# Patient Record
Sex: MALE | Race: WHITE | NOT HISPANIC OR LATINO | URBAN - METROPOLITAN AREA
[De-identification: names, ages, dates, MRNs, and addresses within clinical notes are randomized per-mention and may not be internally consistent; named-entity substitution may affect disease eponyms.]

---

## 2023-09-14 ENCOUNTER — HOSPITAL ENCOUNTER (OUTPATIENT)
Dept: DATA CONVERSION | Facility: HOSPITAL | Age: 58
Discharge: HOME | End: 2023-09-14

## 2023-09-14 DIAGNOSIS — R97.20 ELEVATED PROSTATE SPECIFIC ANTIGEN (PSA): ICD-10-CM

## 2023-10-16 ENCOUNTER — CLINICAL SUPPORT (OUTPATIENT)
Dept: PREADMISSION TESTING | Facility: HOSPITAL | Age: 58
End: 2023-10-16
Payer: COMMERCIAL

## 2023-10-16 VITALS
BODY MASS INDEX: 25.31 KG/M2 | HEIGHT: 71 IN | OXYGEN SATURATION: 100 % | TEMPERATURE: 97.7 F | DIASTOLIC BLOOD PRESSURE: 82 MMHG | WEIGHT: 180.78 LBS | SYSTOLIC BLOOD PRESSURE: 123 MMHG | HEART RATE: 56 BPM | RESPIRATION RATE: 16 BRPM

## 2023-10-16 RX ORDER — MULTIVIT-MIN/FA/LYCOPEN/LUTEIN .4-300-25
1 TABLET ORAL DAILY
COMMUNITY

## 2023-10-16 RX ORDER — IBUPROFEN 600 MG/1
600 TABLET ORAL EVERY 6 HOURS PRN
COMMUNITY

## 2023-10-16 RX ORDER — VIT C/E/ZN/COPPR/LUTEIN/ZEAXAN 250MG-90MG
25 CAPSULE ORAL DAILY
COMMUNITY

## 2023-10-16 ASSESSMENT — DUKE ACTIVITY SCORE INDEX (DASI)
CAN YOU PARTICIPATE IN STRENOUS SPORTS LIKE SWIMMING, SINGLES TENNIS, FOOTBALL, BASKETBALL, OR SKIING: YES
TOTAL_SCORE: 58.2
CAN YOU DO HEAVY WORK AROUND THE HOUSE LIKE SCRUBBING FLOORS OR LIFTING AND MOVING HEAVY FURNITURE: YES
DASI METS SCORE: 9.9
CAN YOU DO MODERATE WORK AROUND THE HOUSE LIKE VACUUMING, SWEEPING FLOORS OR CARRYING GROCERIES: YES
CAN YOU PARTICIPATE IN MODERATE RECREATIONAL ACTIVITIES LIKE GOLF, BOWLING, DANCING, DOUBLES TENNIS OR THROWING A BASEBALL OR FOOTBALL: YES
CAN YOU RUN A SHORT DISTANCE: YES
CAN YOU HAVE SEXUAL RELATIONS: YES
CAN YOU WALK INDOORS, SUCH AS AROUND YOUR HOUSE: YES
CAN YOU CLIMB A FLIGHT OF STAIRS OR WALK UP A HILL: YES
CAN YOU DO LIGHT WORK AROUND THE HOUSE LIKE DUSTING OR WASHING DISHES: YES
CAN YOU WALK A BLOCK OR TWO ON LEVEL GROUND: YES
CAN YOU TAKE CARE OF YOURSELF (EAT, DRESS, BATHE, OR USE TOILET): YES
CAN YOU DO YARD WORK LIKE RAKING LEAVES, WEEDING OR PUSHING A MOWER: YES

## 2023-10-16 ASSESSMENT — ENCOUNTER SYMPTOMS
RESPIRATORY NEGATIVE: 1
ENDOCRINE NEGATIVE: 1
CONSTITUTIONAL NEGATIVE: 1
GASTROINTESTINAL NEGATIVE: 1
MUSCULOSKELETAL NEGATIVE: 1
CARDIOVASCULAR NEGATIVE: 1
PSYCHIATRIC NEGATIVE: 1
NEUROLOGICAL NEGATIVE: 1
EYES NEGATIVE: 1

## 2023-10-16 ASSESSMENT — CHADS2 SCORE
PRIOR STROKE OR TIA OR THROMBOEMBOLISM: NO
DIABETES: NO
CHADS2 SCORE: 0
HYPERTENSION: NO
CHF: NO
AGE GREATER THAN OR EQUAL TO 75: NO

## 2023-10-16 ASSESSMENT — PAIN SCALES - GENERAL: PAINLEVEL_OUTOF10: 0 - NO PAIN

## 2023-10-16 ASSESSMENT — PAIN - FUNCTIONAL ASSESSMENT: PAIN_FUNCTIONAL_ASSESSMENT: 0-10

## 2023-10-16 NOTE — CPM/PAT H&P
"CPM/PAT Evaluation       Name: Qasim Miller (Qasim Miller)  /Age: 1965/58 y.o.     In-Person       Chief Complaint: \"elevated PSA\"    HPI    The patient is a 58 year old male.  For the past year his PSA has been elevated.  He complains of urinary hesitancy and urinary urgency, but no dysuria, hematuria or stress urinary incontinence.  He has been followed by Dr. Blackwood and a prostate MRI was abnormal.  A prostate biopsy is recommended at this time.    No past medical history on file.    Past Surgical History:   Procedure Laterality Date    COLONOSCOPY  10/27/2015    Colonoscopy (Fiberoptic)     Social History     Tobacco Use    Smoking status: Never    Smokeless tobacco: Never   Substance Use Topics    Alcohol use: Yes     Comment: SELDOM     Social History     Substance and Sexual Activity   Drug Use Never       No Known Allergies    Current Outpatient Medications   Medication Sig Dispense Refill    cholecalciferol (Vitamin D-3) 25 MCG (1000 UT) capsule Take 1 capsule (25 mcg) by mouth once daily.      ibuprofen 600 mg tablet Take 1 tablet (600 mg) by mouth every 6 hours if needed for mild pain (1 - 3).      multivitamin with minerals iron-free (Centrum Silver) Take 1 tablet by mouth once daily.       No current facility-administered medications for this visit.     Review of Systems   Constitutional: Negative.    HENT: Negative.     Eyes: Negative.    Respiratory: Negative.     Cardiovascular: Negative.    Gastrointestinal: Negative.    Endocrine: Negative.    Genitourinary:         See HPI   Musculoskeletal: Negative.    Neurological: Negative.    Psychiatric/Behavioral: Negative.       /82   Pulse 56   Temp 36.5 °C (97.7 °F) (Temporal)   Resp 16   Ht 1.803 m (5' 11\")   Wt 82 kg (180 lb 12.4 oz)   SpO2 100%   BMI 25.21 kg/m²        Physical Exam  Vitals reviewed.   Constitutional:       Appearance: Normal appearance.   HENT:      Head: Normocephalic and atraumatic.      " Mouth/Throat:      Mouth: Mucous membranes are moist.      Pharynx: Oropharynx is clear.   Eyes:      Extraocular Movements: Extraocular movements intact.      Pupils: Pupils are equal, round, and reactive to light.   Cardiovascular:      Rate and Rhythm: Regular rhythm. Bradycardia present.      Heart sounds: Normal heart sounds.   Pulmonary:      Breath sounds: Normal breath sounds.   Abdominal:      General: Bowel sounds are normal.      Palpations: Abdomen is soft.   Musculoskeletal:         General: Normal range of motion.      Cervical back: Normal range of motion.   Skin:     General: Skin is warm and dry.   Neurological:      General: No focal deficit present.      Mental Status: He is alert and oriented to person, place, and time.   Psychiatric:         Mood and Affect: Mood normal.         Behavior: Behavior normal.        PAT AIRWAY:   Airway:     Neck ROM::  Full      DASI Risk Score      Flowsheet Row Most Recent Value   DASI SCORE 58.2   METS Score (Will be calculated only when all the questions are answered) 9.9     CHADS2 Score: 0      Revised Cardiac Risk Index      Flowsheet Row Most Recent Value   Revised Cardiac Risk Calculator 0       Stop Bang Score      Flowsheet Row Most Recent Value   Do you snore loudly? 0   Do you often feel tired or fatigued after your sleep? 0   Has anyone ever observed you stop breathing in your sleep? 0   Do you have or are you being treated for high blood pressure? 0   Recent BMI (Calculated) 25.2   Is BMI greater than 35 kg/m2? 0=No   Age older than 50 years old? 1=Yes   Is your neck circumference greater than 17 inches (Male) or 16 inches (Female)? 0     Assessment and Plan:     Elevated prostate specific antigen:  MRI guided ultrasound prostate biopsy      Jelly Franco PA-C

## 2023-10-16 NOTE — PREPROCEDURE INSTRUCTIONS
PAT DISCHARGE INSTRUCTIONS    Please call the Same Day Surgery (SDS) Department of the hospital where your procedure will be performed after 2:00 PM the day before your surgery. If you are scheduled on a Monday, or a Tuesday following a Monday holiday, you will need to call on the last business day prior to your surgery.    [facility]  [id number] [place] [address], [address]  [phone]    [address] [address] [phone]    [facility]  [address]  [place] [id number]  [phone]    Please let your surgeon know if:      You develop any open sores, shingles, burning or painful urination as these may increase your risk of an infection.   You no longer wish to have the surgery.   Any other personal circumstances change that may lead to the need to cancel or defer this surgery-such as being sick or getting admitted to any hospital within one week of your planned procedure.    Your contact details change, such as a change of address or phone number.    Starting now:     Please DO NOT drink alcohol or smoke for 24 hours before surgery. It is well known that quitting smoking can make a huge difference to your health and recovery from surgery. The longer you abstain from smoking, the better your chances of a healthy recovery. If you need help with quitting, call 9-800QUIT-NOW to be connected to a trained counselor who will discuss the best methods to help you quit.     Before your surgery:    Please stop all supplements 7 days prior to surgery. Or as directed by your surgeon.   Please stop taking NSAID pain medicine such as Advil and Motrin 5 days before surgery.    If you develop any fever, cough, cold, rashes, cuts, scratches, scrapes, urinary symptoms or infection anywhere on your body (including teeth and gums) prior to surgery, please call your surgeon’s office as soon as possible. This may require treatment to reduce the chance of  cancellation on the day of surgery.    The day before your surgery:   DIET- Do not eat any solid food or drink anything after midnight the night before surgery. This includes gum, mints, hard candy and coffee.    Get a good night’s rest. Use the special soap for bathing if you have been instructed to use one.    Arrival time is typically 2 hours prior to the time of surgery.    Scheduled surgery times may change and you will be notified if this occurs - please check your personal voicemail for any updates.     On the morning of surgery:   Wear comfortable, loose fitting clothes which open in the front. Please do not wear moisturizers, creams, lotions, makeup or perfume.    Please bring with you to surgery:   Photo ID and insurance card   Current list of medicines and allergies   Pacemaker/ Defibrillator/Heart stent cards   CPAP machine and mask    Slings/ splints/ crutches   A copy of your complete advanced directive/DHPOA.    Please do NOT bring with you to surgery:   All jewelry and valuables should be left at home.   Prosthetic devices such as contact lenses, hearing aids, dentures, eyelash extensions, hairpins and body piercings must be removed prior to going in to the surgical suite.    After outpatient surgery:   A responsible adult MUST accompany you at the time of discharge and stay with you for 24 hours after your surgery. You may NOT drive yourself home after surgery.    Do not drive, operate machinery, make critical decisions or do activities that require co-ordination or balance until after a night’s sleep.   Do not drink alcoholic beverages for 24 hours.   Instructions for resuming your medications will be provided by your surgeon.    CALL YOUR DOCTOR AFTER SURGERY IF YOU HAVE:     Chills and/or a fever of 101° F or higher.    Redness, swelling, pus or drainage from your surgical wound or a bad smell from the wound.    Lightheadedness, fainting or confusion.    Persistent vomiting (throwing up) and are  not able to eat or drink for 12 hours.    Three or more loose, watery bowel movements in 24 hours (diarrhea).   Difficulty or pain while urinating( after non-urological surgery)    Pain and swelling in your legs, especially if it is only on one side.    Difficulty breathing or are breathing faster than normal.    Any new concerning symptoms.

## 2023-10-16 NOTE — H&P (VIEW-ONLY)
"CPM/PAT Evaluation       Name: Qasim Miller (Qasim Mliler)  /Age: 1965/58 y.o.     In-Person       Chief Complaint: \"elevated PSA\"    HPI    The patient is a 58 year old male.  For the past year his PSA has been elevated.  He complains of urinary hesitancy and urinary urgency, but no dysuria, hematuria or stress urinary incontinence.  He has been followed by Dr. Blackwood and a prostate MRI was abnormal.  A prostate biopsy is recommended at this time.    No past medical history on file.    Past Surgical History:   Procedure Laterality Date    COLONOSCOPY  10/27/2015    Colonoscopy (Fiberoptic)     Social History     Tobacco Use    Smoking status: Never    Smokeless tobacco: Never   Substance Use Topics    Alcohol use: Yes     Comment: SELDOM     Social History     Substance and Sexual Activity   Drug Use Never       No Known Allergies    Current Outpatient Medications   Medication Sig Dispense Refill    cholecalciferol (Vitamin D-3) 25 MCG (1000 UT) capsule Take 1 capsule (25 mcg) by mouth once daily.      ibuprofen 600 mg tablet Take 1 tablet (600 mg) by mouth every 6 hours if needed for mild pain (1 - 3).      multivitamin with minerals iron-free (Centrum Silver) Take 1 tablet by mouth once daily.       No current facility-administered medications for this visit.     Review of Systems   Constitutional: Negative.    HENT: Negative.     Eyes: Negative.    Respiratory: Negative.     Cardiovascular: Negative.    Gastrointestinal: Negative.    Endocrine: Negative.    Genitourinary:         See HPI   Musculoskeletal: Negative.    Neurological: Negative.    Psychiatric/Behavioral: Negative.       /82   Pulse 56   Temp 36.5 °C (97.7 °F) (Temporal)   Resp 16   Ht 1.803 m (5' 11\")   Wt 82 kg (180 lb 12.4 oz)   SpO2 100%   BMI 25.21 kg/m²        Physical Exam  Vitals reviewed.   Constitutional:       Appearance: Normal appearance.   HENT:      Head: Normocephalic and atraumatic.      " Mouth/Throat:      Mouth: Mucous membranes are moist.      Pharynx: Oropharynx is clear.   Eyes:      Extraocular Movements: Extraocular movements intact.      Pupils: Pupils are equal, round, and reactive to light.   Cardiovascular:      Rate and Rhythm: Regular rhythm. Bradycardia present.      Heart sounds: Normal heart sounds.   Pulmonary:      Breath sounds: Normal breath sounds.   Abdominal:      General: Bowel sounds are normal.      Palpations: Abdomen is soft.   Musculoskeletal:         General: Normal range of motion.      Cervical back: Normal range of motion.   Skin:     General: Skin is warm and dry.   Neurological:      General: No focal deficit present.      Mental Status: He is alert and oriented to person, place, and time.   Psychiatric:         Mood and Affect: Mood normal.         Behavior: Behavior normal.        PAT AIRWAY:   Airway:     Neck ROM::  Full      DASI Risk Score      Flowsheet Row Most Recent Value   DASI SCORE 58.2   METS Score (Will be calculated only when all the questions are answered) 9.9     CHADS2 Score: 0      Revised Cardiac Risk Index      Flowsheet Row Most Recent Value   Revised Cardiac Risk Calculator 0       Stop Bang Score      Flowsheet Row Most Recent Value   Do you snore loudly? 0   Do you often feel tired or fatigued after your sleep? 0   Has anyone ever observed you stop breathing in your sleep? 0   Do you have or are you being treated for high blood pressure? 0   Recent BMI (Calculated) 25.2   Is BMI greater than 35 kg/m2? 0=No   Age older than 50 years old? 1=Yes   Is your neck circumference greater than 17 inches (Male) or 16 inches (Female)? 0     Assessment and Plan:     Elevated prostate specific antigen:  MRI guided ultrasound prostate biopsy      Jelly Franco PA-C

## 2023-10-18 ENCOUNTER — ANESTHESIA EVENT (OUTPATIENT)
Dept: OPERATING ROOM | Facility: HOSPITAL | Age: 58
End: 2023-10-18
Payer: COMMERCIAL

## 2023-10-20 ENCOUNTER — HOSPITAL ENCOUNTER (OUTPATIENT)
Facility: HOSPITAL | Age: 58
Setting detail: OUTPATIENT SURGERY
Discharge: HOME | End: 2023-10-20
Attending: UROLOGY | Admitting: UROLOGY
Payer: COMMERCIAL

## 2023-10-20 ENCOUNTER — ANESTHESIA (OUTPATIENT)
Dept: OPERATING ROOM | Facility: HOSPITAL | Age: 58
End: 2023-10-20
Payer: COMMERCIAL

## 2023-10-20 VITALS
HEART RATE: 54 BPM | RESPIRATION RATE: 16 BRPM | OXYGEN SATURATION: 100 % | TEMPERATURE: 96.8 F | BODY MASS INDEX: 25.52 KG/M2 | WEIGHT: 182.32 LBS | HEIGHT: 71 IN | DIASTOLIC BLOOD PRESSURE: 63 MMHG | SYSTOLIC BLOOD PRESSURE: 101 MMHG

## 2023-10-20 DIAGNOSIS — R97.20 ELEVATED PSA: ICD-10-CM

## 2023-10-20 PROCEDURE — 88305 TISSUE EXAM BY PATHOLOGIST: CPT | Mod: TC | Performed by: UROLOGY

## 2023-10-20 PROCEDURE — 3600000002 HC OR TIME - INITIAL BASE CHARGE - PROCEDURE LEVEL TWO: Performed by: UROLOGY

## 2023-10-20 PROCEDURE — A55700 PR BIOPSY OF PROSTATE,NEEDLE/PUNCH: Performed by: ANESTHESIOLOGIST ASSISTANT

## 2023-10-20 PROCEDURE — 88305 TISSUE EXAM BY PATHOLOGIST: CPT | Performed by: PATHOLOGY

## 2023-10-20 PROCEDURE — 7100000002 HC RECOVERY ROOM TIME - EACH INCREMENTAL 1 MINUTE: Performed by: UROLOGY

## 2023-10-20 PROCEDURE — 2500000004 HC RX 250 GENERAL PHARMACY W/ HCPCS (ALT 636 FOR OP/ED): Performed by: UROLOGY

## 2023-10-20 PROCEDURE — 2720000007 HC OR 272 NO HCPCS: Performed by: UROLOGY

## 2023-10-20 PROCEDURE — 88305 TISSUE EXAM BY PATHOLOGIST: CPT | Mod: TC,SUR | Performed by: UROLOGY

## 2023-10-20 PROCEDURE — 3700000002 HC GENERAL ANESTHESIA TIME - EACH INCREMENTAL 1 MINUTE: Performed by: UROLOGY

## 2023-10-20 PROCEDURE — 7100000010 HC PHASE TWO TIME - EACH INCREMENTAL 1 MINUTE: Performed by: UROLOGY

## 2023-10-20 PROCEDURE — A55700 PR BIOPSY OF PROSTATE,NEEDLE/PUNCH: Performed by: ANESTHESIOLOGY

## 2023-10-20 PROCEDURE — 3600000007 HC OR TIME - EACH INCREMENTAL 1 MINUTE - PROCEDURE LEVEL TWO: Performed by: UROLOGY

## 2023-10-20 PROCEDURE — 2500000004 HC RX 250 GENERAL PHARMACY W/ HCPCS (ALT 636 FOR OP/ED): Performed by: ANESTHESIOLOGIST ASSISTANT

## 2023-10-20 PROCEDURE — 3700000001 HC GENERAL ANESTHESIA TIME - INITIAL BASE CHARGE: Performed by: UROLOGY

## 2023-10-20 PROCEDURE — 7100000001 HC RECOVERY ROOM TIME - INITIAL BASE CHARGE: Performed by: UROLOGY

## 2023-10-20 PROCEDURE — 7100000009 HC PHASE TWO TIME - INITIAL BASE CHARGE: Performed by: UROLOGY

## 2023-10-20 RX ORDER — MIDAZOLAM HYDROCHLORIDE 1 MG/ML
INJECTION, SOLUTION INTRAMUSCULAR; INTRAVENOUS AS NEEDED
Status: DISCONTINUED | OUTPATIENT
Start: 2023-10-20 | End: 2023-10-20

## 2023-10-20 RX ORDER — FENTANYL CITRATE 50 UG/ML
INJECTION, SOLUTION INTRAMUSCULAR; INTRAVENOUS AS NEEDED
Status: DISCONTINUED | OUTPATIENT
Start: 2023-10-20 | End: 2023-10-20

## 2023-10-20 RX ORDER — CIPROFLOXACIN 500 MG/1
500 TABLET ORAL 2 TIMES DAILY
Qty: 4 TABLET | Refills: 0 | Status: SHIPPED | OUTPATIENT
Start: 2023-10-20

## 2023-10-20 RX ORDER — LABETALOL HYDROCHLORIDE 5 MG/ML
10 INJECTION, SOLUTION INTRAVENOUS ONCE AS NEEDED
Status: DISCONTINUED | OUTPATIENT
Start: 2023-10-20 | End: 2023-10-20 | Stop reason: HOSPADM

## 2023-10-20 RX ORDER — OXYCODONE HCL 10 MG/1
10 TABLET, FILM COATED, EXTENDED RELEASE ORAL ONCE AS NEEDED
Status: DISCONTINUED | OUTPATIENT
Start: 2023-10-20 | End: 2023-10-20 | Stop reason: HOSPADM

## 2023-10-20 RX ORDER — CEFTRIAXONE 2 G/50ML
2 INJECTION, SOLUTION INTRAVENOUS ONCE
Status: COMPLETED | OUTPATIENT
Start: 2023-10-20 | End: 2023-10-20

## 2023-10-20 RX ORDER — CEFTRIAXONE 2 G/1
2 INJECTION, POWDER, FOR SOLUTION INTRAMUSCULAR; INTRAVENOUS ONCE
Status: DISCONTINUED | OUTPATIENT
Start: 2023-10-20 | End: 2023-10-20

## 2023-10-20 RX ORDER — METOCLOPRAMIDE 10 MG/1
10 TABLET ORAL ONCE
Status: CANCELLED | OUTPATIENT
Start: 2023-10-20 | End: 2023-10-20

## 2023-10-20 RX ORDER — FAMOTIDINE 20 MG/1
20 TABLET, FILM COATED ORAL ONCE
Status: CANCELLED | OUTPATIENT
Start: 2023-10-20 | End: 2023-10-20

## 2023-10-20 RX ORDER — DIPHENHYDRAMINE HYDROCHLORIDE 50 MG/ML
12.5 INJECTION INTRAMUSCULAR; INTRAVENOUS ONCE AS NEEDED
Status: DISCONTINUED | OUTPATIENT
Start: 2023-10-20 | End: 2023-10-20 | Stop reason: HOSPADM

## 2023-10-20 RX ORDER — ONDANSETRON HYDROCHLORIDE 2 MG/ML
INJECTION, SOLUTION INTRAVENOUS AS NEEDED
Status: DISCONTINUED | OUTPATIENT
Start: 2023-10-20 | End: 2023-10-20

## 2023-10-20 RX ORDER — ALBUTEROL SULFATE 0.83 MG/ML
2.5 SOLUTION RESPIRATORY (INHALATION) ONCE
Status: CANCELLED | OUTPATIENT
Start: 2023-10-20 | End: 2023-10-20

## 2023-10-20 RX ORDER — ACETAMINOPHEN 10 MG/ML
1000 INJECTION, SOLUTION INTRAVENOUS ONCE AS NEEDED
Status: DISCONTINUED | OUTPATIENT
Start: 2023-10-20 | End: 2023-10-20 | Stop reason: HOSPADM

## 2023-10-20 RX ORDER — PHENYLEPHRINE HYDROCHLORIDE 10 MG/ML
INJECTION INTRAVENOUS AS NEEDED
Status: DISCONTINUED | OUTPATIENT
Start: 2023-10-20 | End: 2023-10-20

## 2023-10-20 RX ORDER — PROPOFOL 10 MG/ML
INJECTION, EMULSION INTRAVENOUS AS NEEDED
Status: DISCONTINUED | OUTPATIENT
Start: 2023-10-20 | End: 2023-10-20

## 2023-10-20 RX ORDER — PROPOFOL 10 MG/ML
INJECTION, EMULSION INTRAVENOUS CONTINUOUS PRN
Status: DISCONTINUED | OUTPATIENT
Start: 2023-10-20 | End: 2023-10-20

## 2023-10-20 RX ORDER — ALBUTEROL SULFATE 0.83 MG/ML
2.5 SOLUTION RESPIRATORY (INHALATION) ONCE AS NEEDED
Status: DISCONTINUED | OUTPATIENT
Start: 2023-10-20 | End: 2023-10-20 | Stop reason: HOSPADM

## 2023-10-20 RX ORDER — ONDANSETRON HYDROCHLORIDE 2 MG/ML
4 INJECTION, SOLUTION INTRAVENOUS ONCE AS NEEDED
Status: DISCONTINUED | OUTPATIENT
Start: 2023-10-20 | End: 2023-10-20 | Stop reason: HOSPADM

## 2023-10-20 RX ORDER — SODIUM CHLORIDE 9 MG/ML
50 INJECTION, SOLUTION INTRAVENOUS CONTINUOUS
Status: DISCONTINUED | OUTPATIENT
Start: 2023-10-20 | End: 2023-10-20 | Stop reason: HOSPADM

## 2023-10-20 RX ORDER — KETOROLAC TROMETHAMINE 15 MG/ML
15 INJECTION, SOLUTION INTRAMUSCULAR; INTRAVENOUS ONCE AS NEEDED
Status: DISCONTINUED | OUTPATIENT
Start: 2023-10-20 | End: 2023-10-20 | Stop reason: HOSPADM

## 2023-10-20 RX ORDER — HYDRALAZINE HYDROCHLORIDE 20 MG/ML
10 INJECTION INTRAMUSCULAR; INTRAVENOUS EVERY 30 MIN PRN
Status: DISCONTINUED | OUTPATIENT
Start: 2023-10-20 | End: 2023-10-20 | Stop reason: HOSPADM

## 2023-10-20 RX ADMIN — PHENYLEPHRINE HYDROCHLORIDE 100 MCG: 10 INJECTION INTRAVENOUS at 07:19

## 2023-10-20 RX ADMIN — PROPOFOL 75 MCG/KG/MIN: 10 INJECTION, EMULSION INTRAVENOUS at 07:13

## 2023-10-20 RX ADMIN — ONDANSETRON 4 MG: 2 INJECTION INTRAMUSCULAR; INTRAVENOUS at 07:18

## 2023-10-20 RX ADMIN — FENTANYL CITRATE 50 MCG: 0.05 INJECTION, SOLUTION INTRAMUSCULAR; INTRAVENOUS at 07:12

## 2023-10-20 RX ADMIN — CEFTRIAXONE SODIUM 2 G: 2 INJECTION, SOLUTION INTRAVENOUS at 07:14

## 2023-10-20 RX ADMIN — MIDAZOLAM 2 MG: 1 INJECTION INTRAMUSCULAR; INTRAVENOUS at 07:05

## 2023-10-20 RX ADMIN — PROPOFOL 70 MG: 10 INJECTION, EMULSION INTRAVENOUS at 07:13

## 2023-10-20 RX ADMIN — SODIUM CHLORIDE 50 ML/HR: 900 INJECTION, SOLUTION INTRAVENOUS at 06:01

## 2023-10-20 RX ADMIN — PHENYLEPHRINE HYDROCHLORIDE 100 MCG: 10 INJECTION INTRAVENOUS at 07:21

## 2023-10-20 SDOH — HEALTH STABILITY: MENTAL HEALTH: CURRENT SMOKER: 0

## 2023-10-20 ASSESSMENT — COLUMBIA-SUICIDE SEVERITY RATING SCALE - C-SSRS
6. HAVE YOU EVER DONE ANYTHING, STARTED TO DO ANYTHING, OR PREPARED TO DO ANYTHING TO END YOUR LIFE?: NO
2. HAVE YOU ACTUALLY HAD ANY THOUGHTS OF KILLING YOURSELF?: NO
1. IN THE PAST MONTH, HAVE YOU WISHED YOU WERE DEAD OR WISHED YOU COULD GO TO SLEEP AND NOT WAKE UP?: NO

## 2023-10-20 ASSESSMENT — PAIN - FUNCTIONAL ASSESSMENT
PAIN_FUNCTIONAL_ASSESSMENT: 0-10

## 2023-10-20 ASSESSMENT — PAIN SCALES - GENERAL
PAINLEVEL_OUTOF10: 0 - NO PAIN
PAIN_LEVEL: 2
PAINLEVEL_OUTOF10: 0 - NO PAIN

## 2023-10-20 NOTE — ANESTHESIA POSTPROCEDURE EVALUATION
Patient: Qasim Miller    Procedure Summary       Date: 10/20/23 Room / Location: [facility] / [facility]    Anesthesia Start: 0704 Anesthesia Stop: 0740    Procedure: MRI GUIDED ULTRASOUND PROSTATE BIOPSY (URONAV,ULTRASOUND AND TECH) *REQUESTS MORNING (Prostate) Diagnosis:     Surgeons: Anselmo Blackwood MD Responsible Provider: Andrew Dillard DO    Anesthesia Type: general ASA Status: 3            Anesthesia Type: general    Vitals Value Taken Time   /74 10/20/23 0750   Temp 36 °C (96.8 °F) 10/20/23 0735   Pulse 66 10/20/23 0750   Resp 16 10/20/23 0750   SpO2 98 % 10/20/23 0750       Anesthesia Post Evaluation    Patient location during evaluation: bedside  Level of consciousness: awake  Pain score: 2  Pain management: adequate  Airway patency: patent  Two or more strategies used to mitigate risk of obstructive sleep apnea  Cardiovascular status: acceptable  Respiratory status: acceptable  Hydration status: acceptable        No notable events documented.

## 2023-10-20 NOTE — ANESTHESIA PREPROCEDURE EVALUATION
Patient: Qasim Miller    Procedure Information       Date/Time: 10/20/23 0700    Procedure: MRI GUIDED ULTRASOUND PROSTATE BIOPSY (URONAV,ULTRASOUND AND TECH) *REQUESTS MORNING    Location: [facility] / [facility]    Surgeons: Anselmo Blackwood MD            Relevant Problems   No relevant active problems     Clinical information reviewed:  Pt. arrived to facility without Pre-admission Testing H&P and/or preoperative studies in the prepopulated EMR.  All information is part of the reviewed EMR. Therefore, this pre-anesthesia note may not include all pertinent medical history as it is fully dependent on computer review, pt. verbal responses and pt. ability to recall significant past medical and/or surgical interventions during the brief pre-anesthesia interview.    NPO Detail:  NPO/Void Status  Date of Last Liquid: 10/19/23  Time of Last Liquid: 1700  Date of Last Solid: 10/19/23  Time of Last Solid: 1700  Last Intake Type: Clear fluids  Time of Last Void: 0330         Physical Exam    Airway  Mallampati: II     Cardiovascular   Rhythm: regular  Rate: normal     Dental - normal exam     Pulmonary   Breath sounds clear to auscultation     Abdominal   Abdomen: soft             Anesthesia Plan    ASA 3     general     The patient is not a current smoker.  Medical reason for not educating patient about risks of obstructive sleep apnea.  intravenous induction   Postoperative administration of opioids is intended.  Anesthetic plan and risks discussed with patient.    Plan discussed with CRNA, attending and CAA.

## 2023-10-20 NOTE — OP NOTE
MRI GUIDED ULTRASOUND PROSTATE BIOPSY (URONAV,ULTRASOUND AND TECH) *REQUESTS MORNING Operative Note     Date: 10/20/2023  OR Location: [facility]    Name: Qasim Miller YOB: 1965, Age: 58 y.o., MRN: [id number], Sex: male    Diagnosis  Elevated PSA Elevated PSA     Procedures    * MRI GUIDED ULTRASOUND PROSTATE BIOPSY (URONAV,ULTRASOUND AND TECH) *REQUESTS MORNING    Surgeons      * Anselmo Blackwood - Primary    Resident/Fellow/Other Assistant:  Surgeon(s) and Role:    Procedure Summary  Anesthesia: Monitor Anesthesia Care  ASA: III  Anesthesia Staff: Anesthesiologist: Andrew Dillard DO  C-AA: KEVIN Estevez  Estimated Blood Loss: 5 mL  Intra-op Medications:   Medication Name Total Dose   sodium chloride 0.9% infusion 34.17 mL   cefTRIAXone (Rocephin) 2 g IV in dextrose 5% 50 mL 2 g              Anesthesia Record               Intraprocedure I/O Totals          Intake    Propofol Drip 0.00 mL    The total shown is the total volume documented since Anesthesia Start was filed.    sodium chloride 0.9% infusion 700.00 mL    cefTRIAXone (Rocephin) 2 g IV in dextrose 5% 50 mL 50.00 mL    Total Intake 750 mL       Output    Urine 0 mL    Est. Blood Loss 0 mL    Total Output 0 mL       Net    Net Volume 750 mL          Specimen:   ID Type Source Tests Collected by Time   1 : BIOPSY Tissue PROSTATE, BIOPSY, LEFT MEDIAL BASE SURGICAL PATHOLOGY EXAM Anselmo Blackwood MD 10/20/2023 0721   2 : BIOPSY Tissue PROSTATE, BIOPSY, LEFT LATERAL BASE SURGICAL PATHOLOGY EXAM Anselmo Blackwood MD 10/20/2023 0726   3 : BIOPSY Tissue PROSTATE, BIOPSY, LEFT MEDIAL APEX SURGICAL PATHOLOGY EXAM Anselmo Blackwood MD 10/20/2023 0727   4 : BIOPSY Tissue PROSTATE, BIOPSY, LEFT LATERAL APEX SURGICAL PATHOLOGY EXAM Anselmo Blackwood MD 10/20/2023 0727   5 : BIOPSY Tissue PROSTATE, BIOPSY, LEFT MEDIAL MID SURGICAL PATHOLOGY EXAM Anselmo Blackwood MD 10/20/2023 0729   6 : BIOPSY Tissue PROSTATE, BIOPSY, LEFT LATERAL MID SURGICAL PATHOLOGY EXAM  Anselmo Blackwood MD 10/20/2023 0729   7 : BIOPSY Tissue PROSTATE, BIOPSY, RIGHT MEDIAL BASE SURGICAL PATHOLOGY EXAM Anselmo Blackwood MD 10/20/2023 0730   8 : BIOPSY Tissue PROSTATE, BIOPSY, RIGHT LATERAL BASE SURGICAL PATHOLOGY EXAM Anselmo Blackwood MD 10/20/2023 0730   9 : BIOPSY Tissue PROSTATE, BIOPSY, RIGHT MEDIAL APEX SURGICAL PATHOLOGY EXAM Anselmo Blackwood MD 10/20/2023 0730   10 : BIOPSY Tissue PROSTATE, BIOPSY, RIGHT LATERAL APEX SURGICAL PATHOLOGY EXAM Anselmo Blackwood MD 10/20/2023 0731   11 : BIOPSY Tissue PROSTATE, BIOPSY, RIGHT MEDIAL MID SURGICAL PATHOLOGY EXAM Anselmo Blackwood MD 10/20/2023 0731   12 : BIOPSY Tissue PROSTATE, BIOPSY, RIGHT LATERAL MID SURGICAL PATHOLOGY EXAM Anselmo Blackwood MD 10/20/2023 0732   13 : BIOPSY Tissue PROSTATE BIOPSY TARGETED MISTY SURGICAL PATHOLOGY EXAM Anselmo Blackwood MD 10/20/2023 0733        Staff:   Circulator: Kendrick Rich RN; Bertha Zimmerman RN  Scrub Person: Haja Leon                  Indications: Qasim Miller is an 58 y.o. male who is having surgery for an elevated PSA.  He presents for MRI fusion guided biopsy of the prostate.  Risks including infection bleeding were reviewed.  Written consent was obtained.    Findings: Biopsies obtained  Drains and/or Catheters: None  Procedure Details: Patient was taken to the operating room and given MAC anesthesia.  He was placed in the left lateral decubitus position.  Digital rectal exam was negative.  A transrectal ultrasound probe was inserted.  Biopsies were obtained from the right and left apex mid gland and base, medially and laterally.  The area of interest was targeted near the right apex.  Patient tolerated the procedure and was transferred to the recovery room in stable condition.  Complications:  None; patient tolerated the procedure well.            Anselmo Blackwood  Phone Number: [phone]

## 2023-11-08 LAB
LABORATORY COMMENT REPORT: NORMAL
PATH REPORT.FINAL DX SPEC: NORMAL
PATH REPORT.GROSS SPEC: NORMAL
PATH REPORT.TOTAL CANCER: NORMAL

## 2024-09-19 ENCOUNTER — PHARMACY VISIT (OUTPATIENT)
Dept: PHARMACY | Facility: CLINIC | Age: 59
End: 2024-09-19
Payer: COMMERCIAL

## 2024-09-19 DIAGNOSIS — Z12.11 SPECIAL SCREENING FOR MALIGNANT NEOPLASMS, COLON: ICD-10-CM

## 2024-09-19 PROCEDURE — RXMED WILLOW AMBULATORY MEDICATION CHARGE

## 2024-09-19 RX ORDER — SODIUM PICOSULFATE, MAGNESIUM OXIDE, AND ANHYDROUS CITRIC ACID 12; 3.5; 1 G/175ML; G/175ML; MG/175ML
LIQUID ORAL
Qty: 350 ML | Refills: 0 | Status: SHIPPED | OUTPATIENT
Start: 2024-09-19

## 2024-10-09 ENCOUNTER — APPOINTMENT (OUTPATIENT)
Dept: UROLOGY | Facility: CLINIC | Age: 59
End: 2024-10-09
Payer: COMMERCIAL

## 2024-10-09 DIAGNOSIS — N40.1 BENIGN PROSTATIC HYPERPLASIA WITH LOWER URINARY TRACT SYMPTOMS, SYMPTOM DETAILS UNSPECIFIED: Primary | ICD-10-CM

## 2024-10-09 DIAGNOSIS — F41.9 ANXIETY: ICD-10-CM

## 2024-10-09 DIAGNOSIS — R97.20 ELEVATED PSA: ICD-10-CM

## 2024-10-09 LAB
APPEARANCE UR: CLEAR
BILIRUB UR STRIP.AUTO-MCNC: NEGATIVE MG/DL
COLOR UR: NORMAL
GLUCOSE UR STRIP.AUTO-MCNC: NORMAL MG/DL
KETONES UR STRIP.AUTO-MCNC: NEGATIVE MG/DL
LEUKOCYTE ESTERASE UR QL STRIP.AUTO: NEGATIVE
NITRITE UR QL STRIP.AUTO: NEGATIVE
PH UR STRIP.AUTO: 6.5 [PH]
PROT UR STRIP.AUTO-MCNC: NEGATIVE MG/DL
RBC # UR STRIP.AUTO: NEGATIVE /UL
SP GR UR STRIP.AUTO: 1.02
UROBILINOGEN UR STRIP.AUTO-MCNC: NORMAL MG/DL

## 2024-10-09 PROCEDURE — 87086 URINE CULTURE/COLONY COUNT: CPT

## 2024-10-09 PROCEDURE — 81003 URINALYSIS AUTO W/O SCOPE: CPT

## 2024-10-09 PROCEDURE — 99204 OFFICE O/P NEW MOD 45 MIN: CPT | Performed by: STUDENT IN AN ORGANIZED HEALTH CARE EDUCATION/TRAINING PROGRAM

## 2024-10-09 PROCEDURE — G2211 COMPLEX E/M VISIT ADD ON: HCPCS | Performed by: STUDENT IN AN ORGANIZED HEALTH CARE EDUCATION/TRAINING PROGRAM

## 2024-10-09 RX ORDER — DIAZEPAM 5 MG/1
5 TABLET ORAL
Qty: 2 TABLET | Refills: 0 | Status: SHIPPED | OUTPATIENT
Start: 2024-10-09 | End: 2024-10-09

## 2024-10-09 RX ORDER — TADALAFIL 5 MG/1
5 TABLET ORAL DAILY
Qty: 30 TABLET | Refills: 11 | Status: SHIPPED | OUTPATIENT
Start: 2024-10-09 | End: 2025-10-09

## 2024-10-09 NOTE — PROGRESS NOTES
Subjective   Patient ID: Qasim Miller is a 59 y.o. male.    HPI   Qasim Miller is a 59 y.o. male with BPH.     Patient has significant lower urinary tract symptoms. He has tried tamsulosin which worked for 2 months but after that it was not helping.     He is not sexually active.     He has had 2 prior prostate biopsies with benign results.    Lab Results   Component Value Date    PSA 5.0 (H) 05/18/2018    PSA 4.44 (H) 04/16/2018     Prostate MRI: 9/15/2023  Small 5 x 4 mm lesion at the junction of the posterolateral and posteromedial sectors of the right peripheral zone at the midgland level.  PI-RADS 4. BPH.  Lesion was marked in DynaCAD.    Review of Systems  A complete review of systems was performed. All systems are noted to be negative unless indicated in the history of present illness, impression, active problem list, or past histories.    Objective   Physical Exam  General: Well developed, well nourished, alert and cooperative, appears in no acute distress   Eyes: Non-injected conjunctiva, sclera clear, no proptosis   Cardiac: Extremities are warm and well perfused. No edema, cyanosis or pallor.    Lungs: Breathing is easy, non-labored. Speaking in clear and complete sentences. Normal diaphragmatic movement.   Abdomen: soft, non-tender   MSK: Ambulatory with steady gait, unassisted   Neuro: alert and oriented to person, place and time   Psych: Demonstrates good judgement and reason, without hallucinations, abnormal affect or abnormal behaviors.   Skin: no obvious lesions, no rashes.   : phallus circumcised, normal in size, no plaques or lesions. Testicles normal in size and texture, no masses         Assessment/Plan    Qasim Miller is a 59 y.o. male with 165 g prostate BPH.    Patient has significant lower urinary tract symptoms. He has tried tamsulosin which worked for 2 months but after that it was not helping.     He is not sexually active.     He has had prior biopsy with benign  results.    I personally reviewed the medical records of the patient including the note of the referring physician including the Prostate MRI: 9/15/2023. Small 5 x 4 mm lesion at the junction of the posterolateral and posteromedial sectors of the right peripheral zone at the midgland level.  PI-RADS 4. BPH.  Lesion was marked in DynaCAD.    I reviewed with the patient the value and significance of a rising PSA, and the role in screening and early detection of prostate cancer. I explained that PSA is prostate specific, yet not prostate cancer specific, and typical etiology for the rise of PSA include UTI, prostate enlargement, prostatic inflammation such as a bacterial prostatitis, urinary retention, and prostate cancer. To narrow our differential diagnosis, we rule out urinary infection and retention, and perform a prostate MRI which will give us more information about the size of the prostate, possible inflammation, and suspicious lesions which will serve as targets for MRI/Ultrasound guided fusion targeted biopsy of the prostate.    Positive MRI will necessitate a biopsy of the indicated lesions, whereas a negative MRI will be interpreted in the context of the clinical suspicion, which includes the PSA velocity (speed of rise of PSA), PSA density, age of the patient, and positive family history for prostate cancer or even breast cancer.  I explained to him that prostate cancer is the most common malignancy in men, however it is not the most common cancer killer for several reasons. These are related to the fact that prostate cancer is mostly not aggressive, slow in it growth, progression, and recurrence. In a lot of men, prostate cancer is not diagnosed and still does not result in cancer-related death, and as such active surveillance has become an accepted management option in low-grade low-stage low-volume prostate cancer. A proportion of men with prostate cancer still require treatment in order to prevent  progression and metastasis, and some men might require a multidisciplinary management including different combinations of surgery, radiation, and systemic therapy. In order to better diagnose and decide on treatment options in prostate cancer, proper diagnosis should be performed and shared decision making between the physician and the patient is key at that point.    The patient understands the overall situation, and is willing to proceed with the plan starting with urine testing followed by MRI of the prostate.      We discussed repeat MRI and in office cystoscopy for further surgical planning.     We also discussed starting Cialis 5 mg once daily for his LUTS.    Plan:  Repeat MRI of the prostate   UA and UCx  Start Cialis 5 mg daily.  Plan cystoscopy in office prior to RASP if no change in MRI appearance of lesion    Diagnoses and all orders for this visit:  Benign prostatic hyperplasia with lower urinary tract symptoms, symptom details unspecified  -     tadalafil (Cialis) 5 mg tablet; Take 1 tablet (5 mg) by mouth once daily.  -     Urinalysis with Reflex Microscopic  -     Urine Culture  Elevated PSA  -     MR prostate with yesica boundaries if pirads 3 or above; Future          Scribe Attestation  By signing my name below, I, Ronaldo Cevallos MD, Scribe attest that this documentation has been prepared under the direction and in the presence of Ronaldo Cevallos MD. All medical record entries made by the Scribe were at my direction or personally dictated by me. I have reviewed the chart and agree that the record accurately reflects my personal performance of the history, physical exam, discussion and plan.

## 2024-10-10 LAB — BACTERIA UR CULT: NORMAL

## 2024-10-28 DIAGNOSIS — F41.9 ANXIETY: Primary | ICD-10-CM

## 2024-10-28 RX ORDER — DIAZEPAM 5 MG/1
5 TABLET ORAL
Qty: 2 TABLET | Refills: 0 | Status: SHIPPED | OUTPATIENT
Start: 2024-10-28 | End: 2024-10-28

## 2024-10-29 ENCOUNTER — HOSPITAL ENCOUNTER (OUTPATIENT)
Dept: RADIOLOGY | Facility: HOSPITAL | Age: 59
Discharge: HOME | End: 2024-10-29
Payer: COMMERCIAL

## 2024-10-29 DIAGNOSIS — R97.20 ELEVATED PSA: ICD-10-CM

## 2024-10-29 PROCEDURE — 72197 MRI PELVIS W/O & W/DYE: CPT

## 2024-10-29 PROCEDURE — A9575 INJ GADOTERATE MEGLUMI 0.1ML: HCPCS | Performed by: STUDENT IN AN ORGANIZED HEALTH CARE EDUCATION/TRAINING PROGRAM

## 2024-10-29 PROCEDURE — 2550000001 HC RX 255 CONTRASTS: Performed by: STUDENT IN AN ORGANIZED HEALTH CARE EDUCATION/TRAINING PROGRAM

## 2024-10-29 RX ORDER — GADOTERATE MEGLUMINE 376.9 MG/ML
15 INJECTION INTRAVENOUS
Status: COMPLETED | OUTPATIENT
Start: 2024-10-29 | End: 2024-10-29

## 2024-11-14 NOTE — PROGRESS NOTES
Subjective   Patient ID: Qasim Miller is a 59 y.o. male who presents for fuv.     HPI  Qasim Miller is a 59 y.o. male patient who presents with for BPH with LUTS.       Patient has significant lower urinary tract symptoms. He has tried tamsulosin which worked for 2 months but after that it was not helping. Not on any medication currently.      He is not sexually active.      He has had 2 prior prostate biopsies with benign results.    10/29/24 MRI prostate changes BPH changes of the transition zone. PI-RADS 2 zone.       Review of Systems  A complete review of systems was performed. All systems are noted to be negative unless indicated in the history of present illness, impression, active problem list, or past histories.      Objective   Physical Exam    Assessment/Plan   Diagnoses and all orders for this visit:  BPH with urinary obstruction  -     FL cystogram 3V; Future  -     Patrick Catheter Removal; Future    Qasim Miller is a 59 y.o. male patient who presents for BPH with LUTS.     I personally reviewed the medical records of the patient including the note of the referring physician including the MRI images as well as report, and demonstrated images to the patient.    Cysto findings above.     I discussed with the patient the findings of MRI on 10/29/24, and explained his options.   Because of the size of his prostate over 100g, in his case 127 g on repeat MRI, in my practice, robotic simple prostatectomy is considered the procedure of choice for him. I explained the procedure in detail, the robotic approach and the access to the prostate by incising the bladder and enucleating the prostate between the capsule and the prostate adenoma, followed by control of bleeding and repair of bladder necessitating a Patrick catheter for bladder decompression ensuring healing. I also explained the perioperative care, the hospital stay, the catheter time, and the recovery process.  I also reviewed the  potential complications including bleeding and infection, the risk of urinary leakage if bladder healing is delayed, risk of transient urinary incontinence, and possible stricture in the future at the level of the prostate apex. We reviewed the benefits of the procedure relieving the obstruction, and delaying the progressing of bladder damage.    Plan:   RASP in [facility] on 12/24    Scribe Attestation  By signing my name below, Edison BERMUDEZ Scribe, attest that this documentation has been prepared under the direction and in the presence of Ronaldo Cevallos MD.      Scribe Attestation  By signing my name below, INinfa Scribe   attest that this documentation has been prepared under the direction and in the presence of Ronaldo Cevallos MD.

## 2024-11-14 NOTE — PROGRESS NOTES
Patient ID: Qasim Miller is a 59 y.o. male.    Procedures  PROCEDURE NOTE:    PREOPERATIVE DIAGNOSIS:  BPH, LUTS    POSTOPERATIVE DIAGNOSIS:  Same    OPERATION:  Flexible Cystourethroscopy      SURGEON:  Ronaldo Cevallos MD    ANESTHESIA:  2%  lidocaine jelly    COMPLICATIONS:  None    EBL: Minimal    SPECIMEN:  Voided urine was not collected and submitted for cytology.    DISPOSITION:  The patient was discharged home after the procedure, per routine.    INDICATIONS: :  Mr. Miller is a 59 y.o. patient with a history of BPH, LUTS who presents today for Cystoscopy.     The indications, risks and benefits of this procedure were discussed with the patient, consent was obtained prior to the procedure, and to the best of my judgement the patient seemed to understand and agree to the procedure.    PROCEDURE:  The patient  was brought into the procedure suite and informed consent was reviewed and confirmed. Vital signs were obtained prior to the procedure: There were no vitals taken for this visit..  The patient was escorted onto the stretcher, placed supine, prepped with betadine and draped in the usual standard surgical fashion.  Intraurethral 2% viscous lidocaine jelly was used for local analgesia.  A 16 Citizen of [place] flexible cystourethroscope was inserted into the urethra.   The penile urethra was normal.  The prostate urethra was very large, multilobar, significant median lobe.  Upon entering the bladder the entire bladder was surveyed in a 360 degree fashion.  The left and right ureteral orifices were in normal orthotopic position effluxing clear yellow urine, bilaterally.   There was no evidence of any bladder lesions, foreign objects, stones or evidence of any mucosal changes. There was a decent size diverticulum in posterior bladder wall. The cystoscope was then retroflexed showing a highly vascularized prostate with 2 large lobes protruding into the bladder.  The bladder neck was then further examined without  any evidence of lesions. The scope was then removed and in an antegrade fashion, the urethra and bladder were again resurveyed with no evidence of additional lesions.  The cystoscope was then fully removed.   The patient tolerated the procedure well.  Vitals were stable after the procedure.  The patient was able to void and was discharged home.  Verbal and written Post procedure instructions were reviewed with the patient.    IMPRESSION:  Very large prostate with intravesical component  No bladder tumor    PLAN:  RASP

## 2024-11-18 ENCOUNTER — APPOINTMENT (OUTPATIENT)
Dept: UROLOGY | Facility: CLINIC | Age: 59
End: 2024-11-18
Payer: COMMERCIAL

## 2024-11-18 DIAGNOSIS — N40.1 BPH WITH URINARY OBSTRUCTION: ICD-10-CM

## 2024-11-18 DIAGNOSIS — N13.8 BPH WITH URINARY OBSTRUCTION: ICD-10-CM

## 2024-11-18 LAB
POC APPEARANCE, URINE: CLEAR
POC BILIRUBIN, URINE: NEGATIVE
POC BLOOD, URINE: NEGATIVE
POC COLOR, URINE: YELLOW
POC GLUCOSE, URINE: NEGATIVE MG/DL
POC KETONES, URINE: NEGATIVE MG/DL
POC LEUKOCYTES, URINE: NEGATIVE
POC NITRITE,URINE: NEGATIVE
POC PH, URINE: 6 PH
POC PROTEIN, URINE: NEGATIVE MG/DL
POC SPECIFIC GRAVITY, URINE: 1.02
POC UROBILINOGEN, URINE: 0.2 EU/DL

## 2024-11-18 PROCEDURE — 52000 CYSTOURETHROSCOPY: CPT | Performed by: STUDENT IN AN ORGANIZED HEALTH CARE EDUCATION/TRAINING PROGRAM

## 2024-11-18 PROCEDURE — 99214 OFFICE O/P EST MOD 30 MIN: CPT | Performed by: STUDENT IN AN ORGANIZED HEALTH CARE EDUCATION/TRAINING PROGRAM

## 2024-11-22 ENCOUNTER — PREP FOR PROCEDURE (OUTPATIENT)
Dept: UROLOGY | Facility: HOSPITAL | Age: 59
End: 2024-11-22
Payer: COMMERCIAL

## 2024-11-22 ENCOUNTER — HOSPITAL ENCOUNTER (OUTPATIENT)
Facility: HOSPITAL | Age: 59
Setting detail: OUTPATIENT SURGERY
End: 2024-11-22
Attending: STUDENT IN AN ORGANIZED HEALTH CARE EDUCATION/TRAINING PROGRAM | Admitting: STUDENT IN AN ORGANIZED HEALTH CARE EDUCATION/TRAINING PROGRAM
Payer: COMMERCIAL

## 2024-11-22 DIAGNOSIS — R39.9 LOWER URINARY TRACT SYMPTOMS (LUTS): Primary | ICD-10-CM

## 2024-11-22 DIAGNOSIS — N40.1 BPH WITH URINARY OBSTRUCTION: Primary | ICD-10-CM

## 2024-11-22 DIAGNOSIS — R31.9 GENITOURINARY BLEEDING: ICD-10-CM

## 2024-11-22 DIAGNOSIS — N13.8 BPH WITH URINARY OBSTRUCTION: Primary | ICD-10-CM

## 2024-11-22 RX ORDER — ACETAMINOPHEN 325 MG/1
975 TABLET ORAL ONCE
OUTPATIENT
Start: 2024-11-22 | End: 2024-11-22

## 2024-11-22 RX ORDER — ALVIMOPAN 12 MG/1
12 CAPSULE ORAL ONCE
OUTPATIENT
Start: 2024-11-22 | End: 2024-11-22

## 2024-11-22 RX ORDER — GABAPENTIN 100 MG/1
300 CAPSULE ORAL ONCE
OUTPATIENT
Start: 2024-11-22 | End: 2024-11-22

## 2024-11-26 ENCOUNTER — CLINICAL SUPPORT (OUTPATIENT)
Dept: PREADMISSION TESTING | Facility: HOSPITAL | Age: 59
End: 2024-11-26
Payer: COMMERCIAL

## 2024-11-26 ENCOUNTER — LAB (OUTPATIENT)
Dept: LAB | Facility: LAB | Age: 59
End: 2024-11-26
Payer: COMMERCIAL

## 2024-11-26 DIAGNOSIS — R31.9 GENITOURINARY BLEEDING: ICD-10-CM

## 2024-11-26 DIAGNOSIS — N13.8 BPH WITH URINARY OBSTRUCTION: ICD-10-CM

## 2024-11-26 DIAGNOSIS — R39.9 LOWER URINARY TRACT SYMPTOMS (LUTS): ICD-10-CM

## 2024-11-26 DIAGNOSIS — N40.1 BPH WITH URINARY OBSTRUCTION: ICD-10-CM

## 2024-11-26 LAB
ANION GAP SERPL CALC-SCNC: 9 MMOL/L (ref 10–20)
APPEARANCE UR: CLEAR
APTT PPP: 32 SECONDS (ref 27–38)
BILIRUB UR STRIP.AUTO-MCNC: NEGATIVE MG/DL
BUN SERPL-MCNC: 13 MG/DL (ref 6–23)
CALCIUM SERPL-MCNC: 9.5 MG/DL (ref 8.6–10.3)
CHLORIDE SERPL-SCNC: 102 MMOL/L (ref 98–107)
CO2 SERPL-SCNC: 29 MMOL/L (ref 21–32)
COLOR UR: ABNORMAL
CREAT SERPL-MCNC: 0.87 MG/DL (ref 0.5–1.3)
EGFRCR SERPLBLD CKD-EPI 2021: >90 ML/MIN/1.73M*2
ERYTHROCYTE [DISTWIDTH] IN BLOOD BY AUTOMATED COUNT: 12.6 % (ref 11.5–14.5)
GLUCOSE SERPL-MCNC: 113 MG/DL (ref 74–99)
GLUCOSE UR STRIP.AUTO-MCNC: NORMAL MG/DL
HCT VFR BLD AUTO: 42.8 % (ref 41–52)
HGB BLD-MCNC: 13.9 G/DL (ref 13.5–17.5)
INR PPP: 1.1 (ref 0.9–1.1)
KETONES UR STRIP.AUTO-MCNC: NEGATIVE MG/DL
LEUKOCYTE ESTERASE UR QL STRIP.AUTO: NEGATIVE
MCH RBC QN AUTO: 27.9 PG (ref 26–34)
MCHC RBC AUTO-ENTMCNC: 32.5 G/DL (ref 32–36)
MCV RBC AUTO: 86 FL (ref 80–100)
NITRITE UR QL STRIP.AUTO: NEGATIVE
NRBC BLD-RTO: 0 /100 WBCS (ref 0–0)
PH UR STRIP.AUTO: 6 [PH]
PLATELET # BLD AUTO: 252 X10*3/UL (ref 150–450)
POTASSIUM SERPL-SCNC: 3.7 MMOL/L (ref 3.5–5.3)
PROT UR STRIP.AUTO-MCNC: NEGATIVE MG/DL
PROTHROMBIN TIME: 12.7 SECONDS (ref 9.8–12.8)
RBC # BLD AUTO: 4.98 X10*6/UL (ref 4.5–5.9)
RBC # UR STRIP.AUTO: ABNORMAL /UL
RBC #/AREA URNS AUTO: NORMAL /HPF
SODIUM SERPL-SCNC: 136 MMOL/L (ref 136–145)
SP GR UR STRIP.AUTO: 1.02
UROBILINOGEN UR STRIP.AUTO-MCNC: NORMAL MG/DL
WBC # BLD AUTO: 5.4 X10*3/UL (ref 4.4–11.3)
WBC #/AREA URNS AUTO: NORMAL /HPF

## 2024-11-26 PROCEDURE — 86901 BLOOD TYPING SEROLOGIC RH(D): CPT

## 2024-11-26 PROCEDURE — 36415 COLL VENOUS BLD VENIPUNCTURE: CPT

## 2024-11-26 PROCEDURE — 86850 RBC ANTIBODY SCREEN: CPT

## 2024-11-26 PROCEDURE — 86900 BLOOD TYPING SEROLOGIC ABO: CPT

## 2024-11-26 ASSESSMENT — DUKE ACTIVITY SCORE INDEX (DASI)
CAN YOU PARTICIPATE IN MODERATE RECREATIONAL ACTIVITIES LIKE GOLF, BOWLING, DANCING, DOUBLES TENNIS OR THROWING A BASEBALL OR FOOTBALL: YES
TOTAL_SCORE: 50.2
DASI METS SCORE: 8.9
CAN YOU DO YARD WORK LIKE RAKING LEAVES, WEEDING OR PUSHING A MOWER: YES
CAN YOU CLIMB A FLIGHT OF STAIRS OR WALK UP A HILL: YES
CAN YOU WALK INDOORS, SUCH AS AROUND YOUR HOUSE: YES
CAN YOU DO HEAVY WORK AROUND THE HOUSE LIKE SCRUBBING FLOORS OR LIFTING AND MOVING HEAVY FURNITURE: NO
CAN YOU TAKE CARE OF YOURSELF (EAT, DRESS, BATHE, OR USE TOILET): YES
CAN YOU PARTICIPATE IN STRENOUS SPORTS LIKE SWIMMING, SINGLES TENNIS, FOOTBALL, BASKETBALL, OR SKIING: YES
CAN YOU WALK A BLOCK OR TWO ON LEVEL GROUND: YES
CAN YOU DO MODERATE WORK AROUND THE HOUSE LIKE VACUUMING, SWEEPING FLOORS OR CARRYING GROCERIES: YES
CAN YOU HAVE SEXUAL RELATIONS: YES
CAN YOU RUN A SHORT DISTANCE: YES
CAN YOU DO LIGHT WORK AROUND THE HOUSE LIKE DUSTING OR WASHING DISHES: YES

## 2024-11-26 ASSESSMENT — ENCOUNTER SYMPTOMS
NECK NEGATIVE: 1
RESPIRATORY NEGATIVE: 1
MUSCULOSKELETAL NEGATIVE: 1
ENDOCRINE NEGATIVE: 1
DIFFICULTY URINATING: 1
EYES NEGATIVE: 1
NEUROLOGICAL NEGATIVE: 1
CARDIOVASCULAR NEGATIVE: 1
CONSTITUTIONAL NEGATIVE: 1
GASTROINTESTINAL NEGATIVE: 1

## 2024-11-26 NOTE — PREPROCEDURE INSTRUCTIONS
You have lab work that needs to be completed at a  facility before your surgery    This summary includes instructions and information to aid you during your perioperative period.  Please read carefully. If you have any questions about your visit today, please call the number listed above.  If you become ill or have any changes to your health before your surgery, please contact your primary care provider and alert your surgeon.    Preparing for your Surgery       Exercises  Preoperative Deep Breathing Exercises  Why it is important to do deep breathing exercises before my surgery?  Deep breathing exercises strengthen your breathing muscles.  This helps you to recover after your surgery and decreases the chance of breathing complications.  How are the deep breathing exercises done?  Sit straight with your back supported.  Breathe in deeply and slowly through your nose. Your lower rib cage should expand and your abdomen may move forward.  Hold that breath for 3 to 5 seconds.  Breathe out through pursed lips, slowly and completely.  Rest and repeat 10 times every hour while awake.  Rest longer if you become dizzy or lightheaded.      Preoperative Brain Exercises    What are brain exercises?  A brain exercise is any activity that engages your thinking (cognitive) skills.    What types of activities are considered brain exercises?  Jigsaw puzzles, crossword puzzles, word jumble, memory games, word search, and many more.  Many can be found free online or on your phone via a mobile boubacar.    Why should I do brain exercises before my surgery?  More recent research has shown brain exercise before surgery can lower the risk of postoperative delirium (confusion) which can be especially important for older adults.  Patients who did brain exercises for 5 to 10 hours the days before surgery, cut their risk of postoperative delirium in half up to 1 week after surgery.    Sit-to-Stand Exercise    What is the sit-to-stand  exercise?  The sit-to-stand exercise strengthens the muscles of your lower body and muscles in the center of your body (core muscles for stability) helping to maintain and improve your strength and mobility.  How do I do the sit-to-stand exercise?  The goal is to do this exercise without using your arms or hands.  If this is too difficult, use your arms and hands or a chair with armrests to help slowly push yourself to the standing position and lower yourself back to the sitting position. As the movement becomes easier use your arms and hands less.    Steps to the sit-to-stand exercise  Sit up tall in a sturdy chair, knees bent, feet flat on the floor shoulder-width apart.  Shift your hips/pelvis forward in the chair to correctly position yourself for the next movement.  Lean forward at your hips.  Stand up straight putting equal weight on both feet.  Check to be sure you are properly aligned with the chair, in a slow controlled movement sit back down.  Repeat this exercise 10-15 times.  If needed you can do it fewer times until your strength improves.  Rest for 1 minute.  Do another 10-15 sit-to-stand exercises.  Try to do this in the morning and evening.        Instructions    Preoperative Fasting Guidelines    Why must I stop eating and drinking near surgery time?  With sedation, food or liquid in your stomach can enter your lungs causing serious complications  Food can increase nausea and vomiting  When do I need to stop eating and drinking before my surgery?      Do not eat any food after midnight the night before your surgery/procedure. You may have up to 13.5 ounces of clear liquid until TWO hours before your instructed arrival time to the hospital.  This includes water, black tea/coffee, (no milk or cream) apple juice, and electrolyte drinks (Gatorade). You may chew gum until TWO hours before your surgery/procedure            Simple things you can do to help prevent blood clots     Blood clots are blockages  that can form in the body's veins. When a blood clot forms in your deep veins, it may be called a deep vein thrombosis, or DVT for short. Blood clots can happen in any part of the body where blood flows, but they are most common in the arms and legs. If a piece of a blood clot breaks free and travels to the lungs, it is called a pulmonary embolus (PE). A PE can be a very serious problem.         Being in the hospital or having surgery can raise your chances of getting a blood clot because you may not be well enough to move around as much as you normally do.         Ways you can help prevent blood clots in the hospital       Wearing SCDs  SCDs stands for Sequential Compression Devices.   SCDs are special sleeves that wrap around your legs. They attach to a pump that fills them with air to gently squeeze your legs every few minutes.  This helps return the blood in your legs to your heart.   SCDs should only be taken off when walking or bathing. SCDs may not be comfortable, but they can help save your life.              Pump SCD leg sleeves  Wearing compression stockings - if your doctor orders them. These special snug-fitting stockings gently squeeze your legs to help blood flow.       Walking. Walking helps move the blood in your legs.   If your doctor says it is ok, try walking the halls at least   5 times a day. Ask us to help you get up, so you don't fall.      Taking any blood-thinning medicines your doctor orders.              Ways you can help prevent blood clots at home         Wearing compression stockings - if your doctor orders them.   Walking - to help move the blood in your legs.    Taking any blood-thinning medicines your doctor orders.      Signs of a blood clot or PE    Tell your doctor or nurse right away if you have any of the problems listed below.         If you are at home, seek medical care right away. Call 911 for chest pain or problems breathing.            Signs of a blood clot (DVT) - such as  pain, swelling, redness, or warmth in your arm or legs.  Signs of a pulmonary embolism (PE) - such as chest pain or feeling short of breath      Tobacco and Alcohol;  Do not drink alcohol or smoke within 24 hours of surgery.  It is best to quit smoking for as long as possible before any surgery or procedure.          The Week before Surgery        Seven days before Surgery  Check your CPM medication instructions  Do the exercises provided to you by CPM   Arrange for a responsible, adult licensed  to take you home after surgery and stay with you for 24 hours.  You will not be permitted to drive yourself home if you have received any anesthetic/sedation  Six days before surgery  Check your CPM medication instructions  Do the exercises provided to you by CPM   Start using Chlorhexidene (CHG) body wash if prescribed  Five days before surgery  Check your CPM medication instructions  Do the exercises provided to you by CPM   Continue to use CHG body wash if prescribed  Three days before surgery  Check your CPM medication instructions  Do the exercises provided to you by CPM   Continue to use CHG body wash if prescribed  Two days before surgery  Check your CPM medication instructions  Do the exercises provided to you by CPM   Continue to use CHG body wash if prescribed    The Day before Surgery       Check your CPM medication and all other CPM instructions including when to stop eating and drinking  You will be called with your arrival time for surgery in the late afternoon.  If you do not receive a call please reach out to your surgeon's office.  Do not smoke or drink 24 hours before surgery  Prepare items to bring with you to the hospital  Shower with your chlorhexidine wash if prescribed  Brush your teeth and use your chlorhexidine dental rinse if prescribed    The Day of Surgery       Check your CPM medication instructions  Ensure you follow the instructions for when to stop eating and drinking  Shower, if  prescribed use CHG.  Do not apply any lotions, creams, moisturizers, perfume or deodorant  Brush your teeth and use your CHG dental rinse if prescribed  Wear loose comfortable clothing  Avoid make-up  Remove  jewelry and piercings, consider professional piercing removal with a plastic spacer if needed  Bring photo ID and Insurance card  Bring an accurate medication list that includes medication dose, frequency and allergies  Bring a copy of your advanced directives (will, health care power of )  Bring any devices and controllers as well as medical devices you have been provided with for surgery (CPAP, slings, braces, etc.)  Dentures, eyeglasses, and contacts will be removed before surgery, please bring cases for contacts or glasses

## 2024-11-26 NOTE — CPM/PAT NURSE NOTE
CPM/PAT Nurse Note      Name: Qasim Miller (Qasim Miller)  /Age: 1965/59 y.o.         Qasim Miller is scheduled for Resection Robot-Assisted Prostate  on 24  Past Medical History:   Diagnosis Date    BPH (benign prostatic hyperplasia)     BPH with LUT    HIV disease (Multi)     HIV viral load: 12,000 (detected) 10/31/24    Vision loss        Past Surgical History:   Procedure Laterality Date    COLONOSCOPY  10/27/2015    Colonoscopy (Fiberoptic)    PROSTATE BIOPSY         Patient  has no history on file for sexual activity.    Family History   Problem Relation Name Age of Onset    Anesthesia related problems Father         No Known Allergies    Prior to Admission medications    Medication Sig Start Date End Date Taking? Authorizing Provider   cholecalciferol (Vitamin D-3) 25 MCG (1000 UT) capsule Take 1 capsule (25 mcg) by mouth once daily.    Historical Provider, MD   ciprofloxacin (Cipro) 500 mg tablet Take 1 tablet (500 mg) by mouth 2 times a day. 10/20/23   Anselmo Blackwood MD   diazePAM (Valium) 5 mg tablet Take 1 tablet (5 mg) by mouth to be given in the clinic or hospital department for 1 dose. 10/28/24 10/28/24  Ronaldo Cevallos MD   ibuprofen 600 mg tablet Take 1 tablet (600 mg) by mouth every 6 hours if needed for mild pain (1 - 3).    Historical Provider, MD   multivitamin with minerals iron-free (Centrum Silver) Take 1 tablet by mouth once daily.    Historical Provider, MD   sod picosulf-mag ox-citric ac (Clenpiq) 10 mg-3.5 gram- 12 gram/175 mL solution Take one bottle twice as directed by the prep instructions 24   Taurus Garcia MD   tadalafil (Cialis) 5 mg tablet Take 1 tablet (5 mg) by mouth once daily. 10/9/24 10/9/25  Ronaldo Cevallos MD        PAT ROS:   Constitutional:   neg    Neuro/Psych:   neg    Eyes:   neg     use of corrective lenses  Ears:   neg    Nose:   neg    Mouth:   neg    Throat:   neg    Neck:   neg    Cardio:   neg    Respiratory:   neg     Endocrine:   neg    GI:   neg    :    difficulty urinating  Musculoskeletal:   neg    Hematologic:   neg    Skin:  neg        DASI Risk Score      Flowsheet Row Clinical Support from 11/26/2024 in [facility] Clinical Support from 10/16/2023 in [facility]   Can you take care of yourself (eat, dress, bathe, or use toilet)?  2.75 filed at 11/26/2024 0908 2.75 filed at 10/16/2023 0909   Can you walk indoors, such as around your house? 1.75 filed at 11/26/2024 0908 1.75 filed at 10/16/2023 0909   Can you walk a block or two on level ground?  2.75 filed at 11/26/2024 0908 2.75 filed at 10/16/2023 0909   Can you climb a flight of stairs or walk up a hill? 5.5 filed at 11/26/2024 0908 5.5 filed at 10/16/2023 0909   Can you run a short distance? 8 filed at 11/26/2024 0908 8 filed at 10/16/2023 0909   Can you do light work around the house like dusting or washing dishes? 2.7 filed at 11/26/2024 0908 2.7 filed at 10/16/2023 0909   Can you do moderate work around the house like vacuuming, sweeping floors or carrying groceries? 3.5 filed at 11/26/2024 0908 3.5 filed at 10/16/2023 0909   Can you do heavy work around the house like scrubbing floors or lifting and moving heavy furniture?  0 filed at 11/26/2024 0908 8 filed at 10/16/2023 0909   Can you do yard work like raking leaves, weeding or pushing a mower? 4.5 filed at 11/26/2024 0908 4.5 filed at 10/16/2023 0909   Can you have sexual relations? 5.25 filed at 11/26/2024 0908 5.25 filed at 10/16/2023 0909   Can you participate in moderate recreational activities like golf, bowling, dancing, doubles tennis or throwing a baseball or football? 6 filed at 11/26/2024 0908 6 filed at 10/16/2023 0909   Can you participate in strenous sports like swimming, singles tennis, football, basketball, or skiing? 7.5 filed at 11/26/2024 0908 7.5 filed at 10/16/2023 0909   DASI SCORE 50.2 filed at 11/26/2024 0908 58.2 filed at 10/16/2023 0909   METS Score  (Will be calculated only when all the questions are answered) 8.9 filed at 11/26/2024 0908 9.9 filed at 10/16/2023 0909          Caprini DVT Assessment    No data to display       Modified Frailty Index    No data to display       CHADS2 Stroke Risk  Current as of 2 minutes ago        N/A 3 to 100%: High Risk   2 to < 3%: Medium Risk   0 to < 2%: Low Risk     Last Change: N/A          This score determines the patient's risk of having a stroke if the patient has atrial fibrillation.        This score is not applicable to this patient. Components are not calculated.          Revised Cardiac Risk Index      Flowsheet Row Clinical Support from 10/16/2023 in [facility]   High-Risk Surgery (Intraperitoneal, Intrathoracic,Suprainguinal vascular) 0 filed at 10/16/2023 0929   History of ischemic heart disease (History of MI, History of positive exercuse test, Current chest paint considered due to myocardial ischemia, Use of nitrate therapy, ECG with pathological Q Waves) 0 filed at 10/16/2023 0929   History of congestive heart failure (pulmonary edemia, bilateral rales or S3 gallop, Paroxysmal nocturnal dyspnea, CXR showing pulmonary vascular redistribution) 0 filed at 10/16/2023 0929   History of cerebrovascular disease (Prior TIA or stroke) 0 filed at 10/16/2023 0929   Pre-operative insulin treatment 0 filed at 10/16/2023 0929   Pre-operative creatinine>2 mg/dl 0 filed at 10/16/2023 0929   Revised Cardiac Risk Calculator 0 filed at 10/16/2023 0929          Apfel Simplified Score    No data to display       Risk Analysis Index Results This Encounter    No data found in the last 10 encounters.       Stop Bang Score      Flowsheet Row Admission (Discharged) from 10/20/2023 in [facility] OR with Anselmo Blackwood MD Clinical Support from 10/16/2023 in [facility]   Do you snore loudly? 0 filed at 10/20/2023 0536 0 filed at 10/16/2023 0910   Do you often feel tired or fatigued  after your sleep? 0 filed at 10/20/2023 0536 0 filed at 10/16/2023 0910   Has anyone ever observed you stop breathing in your sleep? 0 filed at 10/20/2023 0536 0 filed at 10/16/2023 0910   Do you have or are you being treated for high blood pressure? 0 filed at 10/20/2023 0536 0 filed at 10/16/2023 0910   Recent BMI (Calculated) 25.2 filed at 10/20/2023 0536 25.2 filed at 10/16/2023 0910   Is BMI greater than 35 kg/m2? 0=No filed at 10/20/2023 0536 0=No filed at 10/16/2023 0910   Age older than 50 years old? 1=Yes filed at 10/20/2023 0536 1=Yes filed at 10/16/2023 0910   Is your neck circumference greater than 17 inches (Male) or 16 inches (Female)? 0 filed at 10/20/2023 0536 0 filed at 10/16/2023 0910          Prodigy: High Risk  Total Score: 16              Prodigy Age Score      Prodigy Gender Score          ARISCAT Score for Postoperative Pulmonary Complications    No data to display       [place] Perioperative Risk for Myocardial Infarction or Cardiac Arrest (SANDOR)    No data to display         Testing/Diagnostic:           - HIV viral load: 12,000 (detected) 10/31/24       - METS: 8.9       - MRI Prostate: 10/29/24  IMPRESSION:  1.  BPH changes of the transition zone. Diffuse non nodular  hypointensities within the peripheral zone, without evidence of  focally restricted diffusion ( PI-RADS 2).  2. Nonspecific prominent to mildly enlarged pelvic lymph nodes as  described above.            Patient Specialist/PCP:               PCP: Ilya Saenz at [facility] 09/04/24 new patient visit to establish care.        Urology: Ronaldo Cevallos 11/18/24 follow up of BPH with LUTS.     Patient has significant lower urinary tract symptoms. He has tried tamsulosin which worked for 2 months but after that it was not helping.   He has had 2 prior prostate biopsies with benign results.     10/29/24 MRI prostate changes BPH changes of the transition zone. PI-RADS 2 zone.      Plan:  Repeat MRI of the prostate   UA and UCx  Start  Cialis 5 mg daily.  Plan cystoscopy in office prior to RASP if no change in MRI appearance of lesion              _____________________________________________________________________  Medication instructions:   Instructed to hold Vitamins, Supplements and Ibuprofen 7 days prior to surgery              Sherry Lomeli LPN  Preadmission Testing          Nurse Plan of Action:  Nurse call completed under direction of Dr. Buchanan. AVS, medication instructions and labs sent via [employer].

## 2024-11-26 NOTE — SIGNIFICANT EVENT
11/26/24 0908   DASI Activity Score Index   Can you take care of yourself (eat, dress, bathe, or use toilet)?  2.75   Can you walk indoors, such as around your house? 1.75   Can you walk a block or two on level ground?  2.75   Can you climb a flight of stairs or walk up a hill? 5.5   Can you run a short distance? 8   Can you do light work around the house like dusting or washing dishes? 2.7   Can you do moderate work around the house like vacuuming, sweeping floors or carrying groceries? 3.5   Can you do heavy work around the house like scrubbing floors or lifting and moving heavy furniture?  0   Can you do yard work like raking leaves, weeding or pushing a mower? 4.5   Can you have sexual relations? 5.25   Can you participate in moderate recreational activities like golf, bowling, dancing, doubles tennis or throwing a baseball or football? 6   Can you participate in strenous sports like swimming, singles tennis, football, basketball, or skiing? 7.5   DASI SCORE 50.2   METS Score (Will be calculated only when all the questions are answered) 8.9

## 2024-11-27 ENCOUNTER — LAB REQUISITION (OUTPATIENT)
Dept: LAB | Facility: HOSPITAL | Age: 59
End: 2024-11-27
Payer: COMMERCIAL

## 2024-11-27 DIAGNOSIS — N13.8 OTHER OBSTRUCTIVE AND REFLUX UROPATHY: ICD-10-CM

## 2024-11-27 DIAGNOSIS — R31.9 HEMATURIA, UNSPECIFIED: ICD-10-CM

## 2024-11-27 LAB
ABO GROUP (TYPE) IN BLOOD: NORMAL
ANTIBODY SCREEN: NORMAL
RH FACTOR (ANTIGEN D): NORMAL

## 2024-12-03 ENCOUNTER — PREP FOR PROCEDURE (OUTPATIENT)
Dept: UROLOGY | Facility: HOSPITAL | Age: 59
End: 2024-12-03
Payer: COMMERCIAL

## 2024-12-03 DIAGNOSIS — N13.8 BPH WITH URINARY OBSTRUCTION: Primary | ICD-10-CM

## 2024-12-03 DIAGNOSIS — N40.1 BPH WITH URINARY OBSTRUCTION: Primary | ICD-10-CM

## 2024-12-03 RX ORDER — GABAPENTIN 100 MG/1
300 CAPSULE ORAL ONCE
OUTPATIENT
Start: 2024-12-03 | End: 2024-12-03

## 2024-12-03 RX ORDER — ACETAMINOPHEN 325 MG/1
975 TABLET ORAL ONCE
OUTPATIENT
Start: 2024-12-03 | End: 2024-12-03

## 2024-12-05 ENCOUNTER — APPOINTMENT (OUTPATIENT)
Dept: RADIOLOGY | Facility: HOSPITAL | Age: 59
End: 2024-12-05
Payer: COMMERCIAL

## 2024-12-13 ENCOUNTER — APPOINTMENT (OUTPATIENT)
Dept: UROLOGY | Facility: CLINIC | Age: 59
End: 2024-12-13
Payer: COMMERCIAL

## 2024-12-18 ENCOUNTER — ANESTHESIA EVENT (OUTPATIENT)
Dept: OPERATING ROOM | Facility: HOSPITAL | Age: 59
End: 2024-12-18
Payer: COMMERCIAL

## 2024-12-24 ENCOUNTER — ANESTHESIA (OUTPATIENT)
Dept: OPERATING ROOM | Facility: HOSPITAL | Age: 59
End: 2024-12-24
Payer: COMMERCIAL

## 2024-12-24 ENCOUNTER — HOSPITAL ENCOUNTER (OUTPATIENT)
Facility: HOSPITAL | Age: 59
Discharge: HOME | End: 2024-12-25
Attending: STUDENT IN AN ORGANIZED HEALTH CARE EDUCATION/TRAINING PROGRAM | Admitting: STUDENT IN AN ORGANIZED HEALTH CARE EDUCATION/TRAINING PROGRAM
Payer: COMMERCIAL

## 2024-12-24 DIAGNOSIS — Z90.79 H/O PROSTATECTOMY: Primary | ICD-10-CM

## 2024-12-24 DIAGNOSIS — N13.8 BPH WITH URINARY OBSTRUCTION: ICD-10-CM

## 2024-12-24 DIAGNOSIS — N40.1 BPH WITH URINARY OBSTRUCTION: ICD-10-CM

## 2024-12-24 LAB
ABO GROUP (TYPE) IN BLOOD: NORMAL
ABO GROUP (TYPE) IN BLOOD: NORMAL
ANTIBODY SCREEN: NORMAL
RH FACTOR (ANTIGEN D): NORMAL
RH FACTOR (ANTIGEN D): NORMAL

## 2024-12-24 PROCEDURE — A55867 PR LAPS SURG PRST8ECT SMPL STOT ROBOTIC ASSISTANCE: Performed by: ANESTHESIOLOGY

## 2024-12-24 PROCEDURE — 2500000005 HC RX 250 GENERAL PHARMACY W/O HCPCS: Performed by: STUDENT IN AN ORGANIZED HEALTH CARE EDUCATION/TRAINING PROGRAM

## 2024-12-24 PROCEDURE — 3700000001 HC GENERAL ANESTHESIA TIME - INITIAL BASE CHARGE: Performed by: STUDENT IN AN ORGANIZED HEALTH CARE EDUCATION/TRAINING PROGRAM

## 2024-12-24 PROCEDURE — 7100000001 HC RECOVERY ROOM TIME - INITIAL BASE CHARGE: Performed by: STUDENT IN AN ORGANIZED HEALTH CARE EDUCATION/TRAINING PROGRAM

## 2024-12-24 PROCEDURE — 2500000001 HC RX 250 WO HCPCS SELF ADMINISTERED DRUGS (ALT 637 FOR MEDICARE OP): Performed by: STUDENT IN AN ORGANIZED HEALTH CARE EDUCATION/TRAINING PROGRAM

## 2024-12-24 PROCEDURE — 88307 TISSUE EXAM BY PATHOLOGIST: CPT | Performed by: PATHOLOGY

## 2024-12-24 PROCEDURE — 3600000018 HC OR TIME - INITIAL BASE CHARGE - PROCEDURE LEVEL SIX: Performed by: STUDENT IN AN ORGANIZED HEALTH CARE EDUCATION/TRAINING PROGRAM

## 2024-12-24 PROCEDURE — 99238 HOSP IP/OBS DSCHRG MGMT 30/<: CPT | Performed by: NURSE PRACTITIONER

## 2024-12-24 PROCEDURE — 55867 LAPS SURG PRST8ECT SMPL STOT: CPT | Performed by: STUDENT IN AN ORGANIZED HEALTH CARE EDUCATION/TRAINING PROGRAM

## 2024-12-24 PROCEDURE — 88307 TISSUE EXAM BY PATHOLOGIST: CPT | Mod: TC,GEALAB | Performed by: STUDENT IN AN ORGANIZED HEALTH CARE EDUCATION/TRAINING PROGRAM

## 2024-12-24 PROCEDURE — 2720000007 HC OR 272 NO HCPCS: Performed by: STUDENT IN AN ORGANIZED HEALTH CARE EDUCATION/TRAINING PROGRAM

## 2024-12-24 PROCEDURE — 7100000011 HC EXTENDED STAY RECOVERY HOURLY - NURSING UNIT

## 2024-12-24 PROCEDURE — 3700000002 HC GENERAL ANESTHESIA TIME - EACH INCREMENTAL 1 MINUTE: Performed by: STUDENT IN AN ORGANIZED HEALTH CARE EDUCATION/TRAINING PROGRAM

## 2024-12-24 PROCEDURE — 2500000004 HC RX 250 GENERAL PHARMACY W/ HCPCS (ALT 636 FOR OP/ED): Performed by: LICENSED PRACTICAL NURSE

## 2024-12-24 PROCEDURE — 3600000017 HC OR TIME - EACH INCREMENTAL 1 MINUTE - PROCEDURE LEVEL SIX: Performed by: STUDENT IN AN ORGANIZED HEALTH CARE EDUCATION/TRAINING PROGRAM

## 2024-12-24 PROCEDURE — 7100000002 HC RECOVERY ROOM TIME - EACH INCREMENTAL 1 MINUTE: Performed by: STUDENT IN AN ORGANIZED HEALTH CARE EDUCATION/TRAINING PROGRAM

## 2024-12-24 PROCEDURE — RXMED WILLOW AMBULATORY MEDICATION CHARGE

## 2024-12-24 PROCEDURE — 36415 COLL VENOUS BLD VENIPUNCTURE: CPT | Performed by: ANESTHESIOLOGY

## 2024-12-24 PROCEDURE — 2500000004 HC RX 250 GENERAL PHARMACY W/ HCPCS (ALT 636 FOR OP/ED): Performed by: NURSE PRACTITIONER

## 2024-12-24 PROCEDURE — 9420000001 HC RT PATIENT EDUCATION 5 MIN

## 2024-12-24 PROCEDURE — 2500000004 HC RX 250 GENERAL PHARMACY W/ HCPCS (ALT 636 FOR OP/ED): Mod: JZ | Performed by: STUDENT IN AN ORGANIZED HEALTH CARE EDUCATION/TRAINING PROGRAM

## 2024-12-24 PROCEDURE — 2500000001 HC RX 250 WO HCPCS SELF ADMINISTERED DRUGS (ALT 637 FOR MEDICARE OP): Performed by: NURSE PRACTITIONER

## 2024-12-24 PROCEDURE — A55867 PR LAPS SURG PRST8ECT SMPL STOT ROBOTIC ASSISTANCE: Performed by: LICENSED PRACTICAL NURSE

## 2024-12-24 PROCEDURE — 2780000003 HC OR 278 NO HCPCS: Performed by: STUDENT IN AN ORGANIZED HEALTH CARE EDUCATION/TRAINING PROGRAM

## 2024-12-24 PROCEDURE — 86901 BLOOD TYPING SEROLOGIC RH(D): CPT | Performed by: ANESTHESIOLOGY

## 2024-12-24 RX ORDER — ONDANSETRON 4 MG/1
4 TABLET, FILM COATED ORAL EVERY 8 HOURS PRN
Status: DISCONTINUED | OUTPATIENT
Start: 2024-12-24 | End: 2024-12-25 | Stop reason: HOSPADM

## 2024-12-24 RX ORDER — ONDANSETRON HYDROCHLORIDE 2 MG/ML
INJECTION, SOLUTION INTRAVENOUS AS NEEDED
Status: DISCONTINUED | OUTPATIENT
Start: 2024-12-24 | End: 2024-12-24

## 2024-12-24 RX ORDER — ALBUTEROL SULFATE 0.83 MG/ML
2.5 SOLUTION RESPIRATORY (INHALATION) ONCE AS NEEDED
Status: DISCONTINUED | OUTPATIENT
Start: 2024-12-24 | End: 2024-12-24 | Stop reason: HOSPADM

## 2024-12-24 RX ORDER — PHENAZOPYRIDINE HYDROCHLORIDE 100 MG/1
100 TABLET, FILM COATED ORAL 3 TIMES DAILY PRN
Qty: 10 TABLET | Refills: 0 | Status: SHIPPED | OUTPATIENT
Start: 2024-12-24 | End: 2024-12-24

## 2024-12-24 RX ORDER — KETOROLAC TROMETHAMINE 30 MG/ML
INJECTION, SOLUTION INTRAMUSCULAR; INTRAVENOUS AS NEEDED
Status: DISCONTINUED | OUTPATIENT
Start: 2024-12-24 | End: 2024-12-24

## 2024-12-24 RX ORDER — SODIUM CHLORIDE, SODIUM LACTATE, POTASSIUM CHLORIDE, CALCIUM CHLORIDE 600; 310; 30; 20 MG/100ML; MG/100ML; MG/100ML; MG/100ML
100 INJECTION, SOLUTION INTRAVENOUS CONTINUOUS
Status: CANCELLED | OUTPATIENT
Start: 2024-12-24 | End: 2024-12-25

## 2024-12-24 RX ORDER — ACETAMINOPHEN 325 MG/1
975 TABLET ORAL EVERY 6 HOURS
Status: DISCONTINUED | OUTPATIENT
Start: 2024-12-24 | End: 2024-12-25 | Stop reason: HOSPADM

## 2024-12-24 RX ORDER — DOCUSATE SODIUM 100 MG/1
100 CAPSULE, LIQUID FILLED ORAL 2 TIMES DAILY
Status: DISCONTINUED | OUTPATIENT
Start: 2024-12-24 | End: 2024-12-25 | Stop reason: HOSPADM

## 2024-12-24 RX ORDER — PHENAZOPYRIDINE HYDROCHLORIDE 100 MG/1
100 TABLET, FILM COATED ORAL 3 TIMES DAILY PRN
Qty: 10 TABLET | Refills: 0 | Status: SHIPPED | OUTPATIENT
Start: 2024-12-24

## 2024-12-24 RX ORDER — LIDOCAINE HCL/PF 100 MG/5ML
SYRINGE (ML) INTRAVENOUS AS NEEDED
Status: DISCONTINUED | OUTPATIENT
Start: 2024-12-24 | End: 2024-12-24

## 2024-12-24 RX ORDER — GABAPENTIN 300 MG/1
300 CAPSULE ORAL ONCE
Status: COMPLETED | OUTPATIENT
Start: 2024-12-24 | End: 2024-12-24

## 2024-12-24 RX ORDER — HYDROMORPHONE HYDROCHLORIDE 2 MG/ML
INJECTION, SOLUTION INTRAMUSCULAR; INTRAVENOUS; SUBCUTANEOUS AS NEEDED
Status: DISCONTINUED | OUTPATIENT
Start: 2024-12-24 | End: 2024-12-24

## 2024-12-24 RX ORDER — WATER 1 ML/ML
IRRIGANT IRRIGATION AS NEEDED
Status: DISCONTINUED | OUTPATIENT
Start: 2024-12-24 | End: 2024-12-24 | Stop reason: HOSPADM

## 2024-12-24 RX ORDER — ONDANSETRON HYDROCHLORIDE 2 MG/ML
4 INJECTION, SOLUTION INTRAVENOUS EVERY 8 HOURS PRN
Status: CANCELLED | OUTPATIENT
Start: 2024-12-24

## 2024-12-24 RX ORDER — ONDANSETRON 4 MG/1
4 TABLET, FILM COATED ORAL EVERY 8 HOURS PRN
Status: CANCELLED | OUTPATIENT
Start: 2024-12-24

## 2024-12-24 RX ORDER — DIPHENHYDRAMINE HYDROCHLORIDE 50 MG/ML
12.5 INJECTION INTRAMUSCULAR; INTRAVENOUS ONCE AS NEEDED
Status: DISCONTINUED | OUTPATIENT
Start: 2024-12-24 | End: 2024-12-24 | Stop reason: HOSPADM

## 2024-12-24 RX ORDER — ONDANSETRON HYDROCHLORIDE 2 MG/ML
4 INJECTION, SOLUTION INTRAVENOUS EVERY 8 HOURS PRN
Status: DISCONTINUED | OUTPATIENT
Start: 2024-12-24 | End: 2024-12-25 | Stop reason: HOSPADM

## 2024-12-24 RX ORDER — CEFAZOLIN SODIUM 2 G/100ML
2 INJECTION, SOLUTION INTRAVENOUS EVERY 8 HOURS
Status: COMPLETED | OUTPATIENT
Start: 2024-12-24 | End: 2024-12-25

## 2024-12-24 RX ORDER — DEXMEDETOMIDINE IN 0.9 % NACL 20 MCG/5ML
SYRINGE (ML) INTRAVENOUS AS NEEDED
Status: DISCONTINUED | OUTPATIENT
Start: 2024-12-24 | End: 2024-12-24

## 2024-12-24 RX ORDER — PROPOFOL 10 MG/ML
INJECTION, EMULSION INTRAVENOUS AS NEEDED
Status: DISCONTINUED | OUTPATIENT
Start: 2024-12-24 | End: 2024-12-24

## 2024-12-24 RX ORDER — SODIUM CHLORIDE, SODIUM LACTATE, POTASSIUM CHLORIDE, CALCIUM CHLORIDE 600; 310; 30; 20 MG/100ML; MG/100ML; MG/100ML; MG/100ML
20 INJECTION, SOLUTION INTRAVENOUS CONTINUOUS
Status: ACTIVE | OUTPATIENT
Start: 2024-12-24 | End: 2024-12-25

## 2024-12-24 RX ORDER — SULFAMETHOXAZOLE AND TRIMETHOPRIM 400; 80 MG/1; MG/1
1 TABLET ORAL 2 TIMES DAILY
Qty: 14 TABLET | Refills: 0 | Status: SHIPPED | OUTPATIENT
Start: 2024-12-24 | End: 2024-12-24

## 2024-12-24 RX ORDER — OXYCODONE HYDROCHLORIDE 5 MG/1
5 TABLET ORAL EVERY 6 HOURS PRN
Qty: 15 TABLET | Refills: 0 | Status: SHIPPED | OUTPATIENT
Start: 2024-12-24

## 2024-12-24 RX ORDER — FENTANYL CITRATE 50 UG/ML
INJECTION, SOLUTION INTRAMUSCULAR; INTRAVENOUS AS NEEDED
Status: DISCONTINUED | OUTPATIENT
Start: 2024-12-24 | End: 2024-12-24

## 2024-12-24 RX ORDER — SODIUM CHLORIDE 0.9 G/100ML
IRRIGANT IRRIGATION AS NEEDED
Status: DISCONTINUED | OUTPATIENT
Start: 2024-12-24 | End: 2024-12-24 | Stop reason: HOSPADM

## 2024-12-24 RX ORDER — SULFAMETHOXAZOLE AND TRIMETHOPRIM 400; 80 MG/1; MG/1
1 TABLET ORAL 2 TIMES DAILY
Qty: 14 TABLET | Refills: 0 | Status: SHIPPED | OUTPATIENT
Start: 2024-12-24 | End: 2025-01-02

## 2024-12-24 RX ORDER — DROPERIDOL 2.5 MG/ML
0.62 INJECTION, SOLUTION INTRAMUSCULAR; INTRAVENOUS ONCE AS NEEDED
Status: DISCONTINUED | OUTPATIENT
Start: 2024-12-24 | End: 2024-12-24 | Stop reason: HOSPADM

## 2024-12-24 RX ORDER — CEFAZOLIN SODIUM 2 G/100ML
2 INJECTION, SOLUTION INTRAVENOUS ONCE
Status: DISCONTINUED | OUTPATIENT
Start: 2024-12-24 | End: 2024-12-24 | Stop reason: HOSPADM

## 2024-12-24 RX ORDER — OXYCODONE HYDROCHLORIDE 5 MG/1
5 TABLET ORAL EVERY 6 HOURS PRN
Qty: 15 TABLET | Refills: 0 | Status: SHIPPED | OUTPATIENT
Start: 2024-12-24 | End: 2024-12-24

## 2024-12-24 RX ORDER — POLYETHYLENE GLYCOL 3350 17 G/17G
17 POWDER, FOR SOLUTION ORAL DAILY
Status: CANCELLED | OUTPATIENT
Start: 2024-12-24

## 2024-12-24 RX ORDER — ROCURONIUM BROMIDE 10 MG/ML
INJECTION, SOLUTION INTRAVENOUS AS NEEDED
Status: DISCONTINUED | OUTPATIENT
Start: 2024-12-24 | End: 2024-12-24

## 2024-12-24 RX ORDER — BUPIVACAINE HYDROCHLORIDE 5 MG/ML
INJECTION, SOLUTION EPIDURAL; INTRACAUDAL AS NEEDED
Status: DISCONTINUED | OUTPATIENT
Start: 2024-12-24 | End: 2024-12-24 | Stop reason: HOSPADM

## 2024-12-24 RX ORDER — SODIUM CHLORIDE, SODIUM LACTATE, POTASSIUM CHLORIDE, CALCIUM CHLORIDE 600; 310; 30; 20 MG/100ML; MG/100ML; MG/100ML; MG/100ML
100 INJECTION, SOLUTION INTRAVENOUS CONTINUOUS
Status: ACTIVE | OUTPATIENT
Start: 2024-12-24 | End: 2024-12-25

## 2024-12-24 RX ORDER — DOCUSATE SODIUM 100 MG/1
100 CAPSULE, LIQUID FILLED ORAL 2 TIMES DAILY
Qty: 14 CAPSULE | Refills: 0 | Status: SHIPPED | OUTPATIENT
Start: 2024-12-24 | End: 2024-12-24

## 2024-12-24 RX ORDER — ONDANSETRON HYDROCHLORIDE 2 MG/ML
4 INJECTION, SOLUTION INTRAVENOUS ONCE AS NEEDED
Status: DISCONTINUED | OUTPATIENT
Start: 2024-12-24 | End: 2024-12-24 | Stop reason: HOSPADM

## 2024-12-24 RX ORDER — ACETAMINOPHEN 325 MG/1
975 TABLET ORAL EVERY 6 HOURS
Status: CANCELLED | OUTPATIENT
Start: 2024-12-24

## 2024-12-24 RX ORDER — CEFAZOLIN 1 G/1
INJECTION, POWDER, FOR SOLUTION INTRAVENOUS AS NEEDED
Status: DISCONTINUED | OUTPATIENT
Start: 2024-12-24 | End: 2024-12-24

## 2024-12-24 RX ORDER — BACITRACIN 500 [USP'U]/G
OINTMENT TOPICAL 2 TIMES DAILY
Qty: 28.4 G | Refills: 0 | Status: SHIPPED | OUTPATIENT
Start: 2024-12-24

## 2024-12-24 RX ORDER — MIDAZOLAM HYDROCHLORIDE 1 MG/ML
INJECTION INTRAMUSCULAR; INTRAVENOUS AS NEEDED
Status: DISCONTINUED | OUTPATIENT
Start: 2024-12-24 | End: 2024-12-24

## 2024-12-24 RX ORDER — ACETAMINOPHEN 325 MG/1
975 TABLET ORAL ONCE
Status: COMPLETED | OUTPATIENT
Start: 2024-12-24 | End: 2024-12-24

## 2024-12-24 RX ORDER — OXYCODONE HYDROCHLORIDE 5 MG/1
5 TABLET ORAL EVERY 4 HOURS PRN
Status: DISCONTINUED | OUTPATIENT
Start: 2024-12-24 | End: 2024-12-24 | Stop reason: HOSPADM

## 2024-12-24 RX ORDER — DOCUSATE SODIUM 100 MG/1
100 CAPSULE, LIQUID FILLED ORAL 2 TIMES DAILY
Qty: 14 CAPSULE | Refills: 0 | Status: SHIPPED | OUTPATIENT
Start: 2024-12-24 | End: 2025-01-02

## 2024-12-24 RX ORDER — SODIUM CHLORIDE, SODIUM LACTATE, POTASSIUM CHLORIDE, CALCIUM CHLORIDE 600; 310; 30; 20 MG/100ML; MG/100ML; MG/100ML; MG/100ML
100 INJECTION, SOLUTION INTRAVENOUS CONTINUOUS
Status: DISCONTINUED | OUTPATIENT
Start: 2024-12-24 | End: 2024-12-24 | Stop reason: HOSPADM

## 2024-12-24 RX ORDER — MORPHINE SULFATE 10 MG/ML
2 INJECTION, SOLUTION INTRAMUSCULAR; INTRAVENOUS EVERY 4 HOURS PRN
Status: DISCONTINUED | OUTPATIENT
Start: 2024-12-24 | End: 2024-12-25 | Stop reason: HOSPADM

## 2024-12-24 RX ORDER — BACITRACIN ZINC 500 UNIT/G
OINTMENT (GRAM) TOPICAL 2 TIMES DAILY
Qty: 28 G | Refills: 0 | Status: SHIPPED | OUTPATIENT
Start: 2024-12-24 | End: 2024-12-24

## 2024-12-24 RX ORDER — MEPERIDINE HYDROCHLORIDE 25 MG/ML
12.5 INJECTION INTRAMUSCULAR; INTRAVENOUS; SUBCUTANEOUS EVERY 10 MIN PRN
Status: DISCONTINUED | OUTPATIENT
Start: 2024-12-24 | End: 2024-12-24 | Stop reason: HOSPADM

## 2024-12-24 RX ORDER — OXYCODONE HYDROCHLORIDE 5 MG/1
5 TABLET ORAL EVERY 6 HOURS PRN
Status: DISCONTINUED | OUTPATIENT
Start: 2024-12-24 | End: 2024-12-25 | Stop reason: HOSPADM

## 2024-12-24 RX ADMIN — CEFAZOLIN SODIUM 2 G: 2 INJECTION, SOLUTION INTRAVENOUS at 23:52

## 2024-12-24 RX ADMIN — ACETAMINOPHEN 975 MG: 325 TABLET, FILM COATED ORAL at 06:50

## 2024-12-24 RX ADMIN — SODIUM CHLORIDE, POTASSIUM CHLORIDE, SODIUM LACTATE AND CALCIUM CHLORIDE 100 ML/HR: 600; 310; 30; 20 INJECTION, SOLUTION INTRAVENOUS at 15:59

## 2024-12-24 RX ADMIN — ACETAMINOPHEN 975 MG: 325 TABLET, FILM COATED ORAL at 20:39

## 2024-12-24 RX ADMIN — GABAPENTIN 300 MG: 300 CAPSULE ORAL at 06:51

## 2024-12-24 RX ADMIN — DOCUSATE SODIUM 100 MG: 100 CAPSULE, LIQUID FILLED ORAL at 20:39

## 2024-12-24 RX ADMIN — CEFAZOLIN SODIUM 2 G: 2 INJECTION, SOLUTION INTRAVENOUS at 15:56

## 2024-12-24 SDOH — SOCIAL STABILITY: SOCIAL INSECURITY: DO YOU FEEL ANYONE HAS EXPLOITED OR TAKEN ADVANTAGE OF YOU FINANCIALLY OR OF YOUR PERSONAL PROPERTY?: NO

## 2024-12-24 SDOH — SOCIAL STABILITY: SOCIAL INSECURITY: WITHIN THE LAST YEAR, HAVE YOU BEEN AFRAID OF YOUR PARTNER OR EX-PARTNER?: PATIENT DECLINED

## 2024-12-24 SDOH — SOCIAL STABILITY: SOCIAL INSECURITY: HAVE YOU HAD ANY THOUGHTS OF HARMING ANYONE ELSE?: NO

## 2024-12-24 SDOH — SOCIAL STABILITY: SOCIAL INSECURITY
WITHIN THE LAST YEAR, HAVE YOU BEEN KICKED, HIT, SLAPPED, OR OTHERWISE PHYSICALLY HURT BY YOUR PARTNER OR EX-PARTNER?: PATIENT DECLINED

## 2024-12-24 SDOH — SOCIAL STABILITY: SOCIAL INSECURITY
WITHIN THE LAST YEAR, HAVE YOU BEEN RAPED OR FORCED TO HAVE ANY KIND OF SEXUAL ACTIVITY BY YOUR PARTNER OR EX-PARTNER?: PATIENT DECLINED

## 2024-12-24 SDOH — ECONOMIC STABILITY: FOOD INSECURITY: WITHIN THE PAST 12 MONTHS, THE FOOD YOU BOUGHT JUST DIDN'T LAST AND YOU DIDN'T HAVE MONEY TO GET MORE.: NEVER TRUE

## 2024-12-24 SDOH — ECONOMIC STABILITY: FOOD INSECURITY: WITHIN THE PAST 12 MONTHS, YOU WORRIED THAT YOUR FOOD WOULD RUN OUT BEFORE YOU GOT THE MONEY TO BUY MORE.: NEVER TRUE

## 2024-12-24 SDOH — SOCIAL STABILITY: SOCIAL INSECURITY: ARE YOU OR HAVE YOU BEEN THREATENED OR ABUSED PHYSICALLY, EMOTIONALLY, OR SEXUALLY BY ANYONE?: NO

## 2024-12-24 SDOH — SOCIAL STABILITY: SOCIAL INSECURITY: HAVE YOU HAD THOUGHTS OF HARMING ANYONE ELSE?: NO

## 2024-12-24 SDOH — SOCIAL STABILITY: SOCIAL INSECURITY: ABUSE: ADULT

## 2024-12-24 SDOH — ECONOMIC STABILITY: INCOME INSECURITY: IN THE PAST 12 MONTHS HAS THE ELECTRIC, GAS, OIL, OR WATER COMPANY THREATENED TO SHUT OFF SERVICES IN YOUR HOME?: NO

## 2024-12-24 SDOH — SOCIAL STABILITY: SOCIAL INSECURITY
WITHIN THE LAST YEAR, HAVE YOU BEEN HUMILIATED OR EMOTIONALLY ABUSED IN OTHER WAYS BY YOUR PARTNER OR EX-PARTNER?: PATIENT DECLINED

## 2024-12-24 SDOH — SOCIAL STABILITY: SOCIAL INSECURITY: ARE THERE ANY APPARENT SIGNS OF INJURIES/BEHAVIORS THAT COULD BE RELATED TO ABUSE/NEGLECT?: NO

## 2024-12-24 SDOH — SOCIAL STABILITY: SOCIAL INSECURITY: HAS ANYONE EVER THREATENED TO HURT YOUR FAMILY OR YOUR PETS?: NO

## 2024-12-24 SDOH — SOCIAL STABILITY: SOCIAL INSECURITY: DO YOU FEEL UNSAFE GOING BACK TO THE PLACE WHERE YOU ARE LIVING?: NO

## 2024-12-24 SDOH — HEALTH STABILITY: MENTAL HEALTH: CURRENT SMOKER: 0

## 2024-12-24 SDOH — SOCIAL STABILITY: SOCIAL INSECURITY: DOES ANYONE TRY TO KEEP YOU FROM HAVING/CONTACTING OTHER FRIENDS OR DOING THINGS OUTSIDE YOUR HOME?: NO

## 2024-12-24 SDOH — SOCIAL STABILITY: SOCIAL INSECURITY: WERE YOU ABLE TO COMPLETE ALL THE BEHAVIORAL HEALTH SCREENINGS?: YES

## 2024-12-24 ASSESSMENT — ENCOUNTER SYMPTOMS
DIFFICULTY URINATING: 1
RESPIRATORY NEGATIVE: 1
CONSTITUTIONAL NEGATIVE: 1
GASTROINTESTINAL NEGATIVE: 1

## 2024-12-24 ASSESSMENT — COGNITIVE AND FUNCTIONAL STATUS - GENERAL
DAILY ACTIVITIY SCORE: 24
PATIENT BASELINE BEDBOUND: NO
MOBILITY SCORE: 24

## 2024-12-24 ASSESSMENT — PAIN SCALES - GENERAL
PAINLEVEL_OUTOF10: 0 - NO PAIN

## 2024-12-24 ASSESSMENT — LIFESTYLE VARIABLES
SKIP TO QUESTIONS 9-10: 1
HOW OFTEN DO YOU HAVE A DRINK CONTAINING ALCOHOL: NEVER
AUDIT-C TOTAL SCORE: 0
HOW OFTEN DO YOU HAVE 6 OR MORE DRINKS ON ONE OCCASION: NEVER
HOW MANY STANDARD DRINKS CONTAINING ALCOHOL DO YOU HAVE ON A TYPICAL DAY: PATIENT DOES NOT DRINK
AUDIT-C TOTAL SCORE: 0

## 2024-12-24 ASSESSMENT — PATIENT HEALTH QUESTIONNAIRE - PHQ9
1. LITTLE INTEREST OR PLEASURE IN DOING THINGS: NOT AT ALL
2. FEELING DOWN, DEPRESSED OR HOPELESS: NOT AT ALL
SUM OF ALL RESPONSES TO PHQ9 QUESTIONS 1 & 2: 0

## 2024-12-24 ASSESSMENT — ACTIVITIES OF DAILY LIVING (ADL)
JUDGMENT_ADEQUATE_SAFELY_COMPLETE_DAILY_ACTIVITIES: YES
DRESSING YOURSELF: INDEPENDENT
FEEDING YOURSELF: INDEPENDENT
WALKS IN HOME: INDEPENDENT
BATHING: INDEPENDENT
HEARING - RIGHT EAR: FUNCTIONAL
TOILETING: INDEPENDENT
ADEQUATE_TO_COMPLETE_ADL: YES
LACK_OF_TRANSPORTATION: NO
PATIENT'S MEMORY ADEQUATE TO SAFELY COMPLETE DAILY ACTIVITIES?: YES
HEARING - LEFT EAR: FUNCTIONAL
GROOMING: INDEPENDENT

## 2024-12-24 ASSESSMENT — PAIN - FUNCTIONAL ASSESSMENT: PAIN_FUNCTIONAL_ASSESSMENT: UNABLE TO SELF-REPORT

## 2024-12-24 ASSESSMENT — COLUMBIA-SUICIDE SEVERITY RATING SCALE - C-SSRS
6. HAVE YOU EVER DONE ANYTHING, STARTED TO DO ANYTHING, OR PREPARED TO DO ANYTHING TO END YOUR LIFE?: NO
1. IN THE PAST MONTH, HAVE YOU WISHED YOU WERE DEAD OR WISHED YOU COULD GO TO SLEEP AND NOT WAKE UP?: NO
2. HAVE YOU ACTUALLY HAD ANY THOUGHTS OF KILLING YOURSELF?: NO

## 2024-12-24 NOTE — ANESTHESIA PREPROCEDURE EVALUATION
Patient: Qasim Miller    Procedure Information       Date/Time: 12/24/24 0735    Procedure: ROBOTIC ASSISTED SIMPLE PROSTATE RESECTION    Location: [facility] / [facility]    Surgeons: Ronaldo Cevallos MD          Vitals:    12/24/24 0633   BP: 134/79   Pulse: 90   Resp: 17   Temp: 36.6 °C (97.9 °F)   SpO2: 96%       Past Surgical History:   Procedure Laterality Date    COLONOSCOPY  10/27/2015    Colonoscopy (Fiberoptic)    PROSTATE BIOPSY  10/20/2023     Past Medical History:   Diagnosis Date    Adhesive capsulitis of right shoulder 11/30/2021    Allergic rhinitis     BPH (benign prostatic hyperplasia)     BPH with LUT    Dyslipidemia     Elevated PSA     Gallbladder disease     Hemangioma of liver     HIV disease (Multi)     HIV viral load: 12,000 (detected) 10/31/24    Urinary obstruction     Vision loss        Current Facility-Administered Medications:     ceFAZolin (Ancef) 2 g in dextrose (iso)  mL, 2 g, intravenous, Once, Ronaldo Cevallos MD  Prior to Admission medications    Medication Sig Start Date End Date Taking? Authorizing Provider   cholecalciferol (Vitamin D-3) 25 MCG (1000 UT) capsule Take 1 capsule (25 mcg) by mouth once daily.   Yes Historical Provider, MD   diazePAM (Valium) 5 mg tablet Take 1 tablet (5 mg) by mouth to be given in the clinic or hospital department for 1 dose.  Patient not taking: Reported on 12/24/2024 10/28/24 10/28/24  Ronaldo Cevallos MD   ibuprofen 600 mg tablet Take 1 tablet (600 mg) by mouth every 6 hours if needed for mild pain (1 - 3).    Historical Provider, MD   multivitamin with minerals iron-free (Centrum Silver) Take 1 tablet by mouth once daily.    Historical Provider, MD   sod picosulf-mag ox-citric ac (Clenpiq) 10 mg-3.5 gram- 12 gram/175 mL solution Take one bottle twice as directed by the prep instructions  Patient not taking: Reported on 11/26/2024 9/19/24   Taurus Garcia MD     No Known Allergies  Social History     Tobacco Use    Smoking  status: Never    Smokeless tobacco: Never   Substance Use Topics    Alcohol use: Yes     Comment: SELDOM         Chemistry    Lab Results   Component Value Date/Time     11/26/2024 1301    K 3.7 11/26/2024 1301     11/26/2024 1301    CO2 29 11/26/2024 1301    BUN 13 11/26/2024 1301    CREATININE 0.87 11/26/2024 1301    Lab Results   Component Value Date/Time    CALCIUM 9.5 11/26/2024 1301    ALKPHOS 71 05/18/2018 0801    AST 19 05/18/2018 0801    ALT 18 05/18/2018 0801    BILITOT 0.8 05/18/2018 0801          Lab Results   Component Value Date/Time    WBC 5.4 11/26/2024 1301    HGB 13.9 11/26/2024 1301    HCT 42.8 11/26/2024 1301     11/26/2024 1301     Lab Results   Component Value Date/Time    PROTIME 12.7 11/26/2024 1301    INR 1.1 11/26/2024 1301     No results found for this or any previous visit (from the past 4464 hours).  No results found for this or any previous visit from the past 1095 days.      Relevant Problems   /Renal   (+) BPH with urinary obstruction       Clinical information reviewed:   Tobacco  Allergies  Meds   Med Hx  Surg Hx   Fam Hx  Soc Hx        NPO Detail:  NPO/Void Status  Date of Last Liquid: 12/23/24  Time of Last Liquid: 2359  Date of Last Solid: 12/23/24  Time of Last Solid: 1730         Physical Exam    Airway  Mallampati: II     Cardiovascular - normal exam     Dental    Pulmonary    Abdominal            Anesthesia Plan    History of general anesthesia?: yes  History of complications of general anesthesia?: no    ASA 2     general     The patient is not a current smoker.  Patient was not previously instructed to abstain from smoking on day of procedure.  Patient did not smoke on day of procedure.  Education provided regarding risk of obstructive sleep apnea.  intravenous induction   Anesthetic plan and risks discussed with patient.    Plan discussed with CRNA.

## 2024-12-24 NOTE — OP NOTE
ROBOTIC ASSISTED SIMPLE PROSTATE RESECTION Operative Note     Date: 2024  OR Location: [facility]    Name: Qasim Miller, : 1965, Age: 59 y.o., MRN: [id number], Sex: male    Diagnosis  Pre-op Diagnosis      * BPH with urinary obstruction [N40.1, N13.8] Post-op Diagnosis     * BPH with urinary obstruction [N40.1, N13.8]     Procedures  ROBOTIC ASSISTED SIMPLE PROSTATE RESECTION  32347 - ME LAPS SURG [id number] SMPL STOT ROBOTIC ASSISTANCE      Surgeons      * Ronaldo Cevallos - Primary    Resident/Fellow/Other Assistant:  Surgeons and Role:  * No surgeons found with a matching role *    Staff:   Circulator: Crystal  Surgical Assistant: Kassandra  Circulator: Mirta Morales Person: Any Sutton Circulator: Naomy Sutton Scrub: Laay    Anesthesia Staff: Anesthesiologist: Carlos Vo MD  CRNA: DORIAN Joseph    Procedure Summary  Anesthesia: General  ASA: II  Estimated Blood Loss: 100 mL  Intra-op Medications:   Administrations occurring from 0735 to 1135 on 24:   Medication Name Total Dose   sodium chloride 0.9 % irrigation solution 3,000 mL   bupivacaine PF 0.5 % (Marcaine) 0.5 % (5 mg/mL) injection 30 mL   sterile water irrigation solution 500 mL   ceFAZolin (Ancef) 1 g 2 g   dexAMETHasone (Decadron) injection 4 mg/mL 8 mg   dexMEDETOMidine 4 mcg/mL in NS syringe 16 mcg   fentaNYL (Sublimaze) injection 50 mcg/mL 50 mcg   HYDROmorphone (Dilaudid) injection 2 mg/mL 1 mg   ketorolac (Toradol) injection 30 mg 30 mg   LR bolus Cannot be calculated   lidocaine (cardiac) injection 2% prefilled syringe 50 mg   midazolam PF (Versed) injection 1 mg/mL 1 mg   ondansetron (Zofran) 2 mg/mL injection 4 mg   propofol (Diprivan) injection 10 mg/mL 200 mg   rocuronium (ZeMuron) 50 mg/5 mL injection 100 mg   sugammadex (Bridion) 200 mg/2 mL injection 200 mg              Anesthesia Record               Intraprocedure I/O Totals          Intake    LR bolus 1000.00 mL    Total Intake 1000 mL           Specimen:   ID Type Source Tests Collected by Time   1 : PROSTATE ADENOMA Tissue PROSTATE RADICAL PROSTATECTOMY SURGICAL PATHOLOGY EXAM Ronaldo Cevallos MD 12/24/2024 0937                 Drains and/or Catheters:   Urethral Catheter Latex 22 Fr. (Active)       [REMOVED] Urethral Catheter Latex 18 Fr. (Removed)       Tourniquet Times:         Implants:     Findings: very large prostate with prominent intravesical median lobe    Indications: Qasim Miller is an 59 y.o. male who is having surgery for BPH with urinary obstruction [N40.1, N13.8]. Medical therapy failed. MRI showed a 127g prostate.    The patient was seen in the preoperative area. The risks, benefits, complications, treatment options, non-operative alternatives, expected recovery and outcomes were discussed with the patient. The possibilities of reaction to medication, pulmonary aspiration, injury to surrounding structures, bleeding, recurrent infection, the need for additional procedures, failure to diagnose a condition, and creating a complication requiring transfusion or operation were discussed with the patient. The patient concurred with the proposed plan, giving informed consent.  The site of surgery was properly noted/marked if necessary per policy. The patient has been actively warmed in preoperative area. Preoperative antibiotics have been ordered and given within 1 hours of incision. Venous thrombosis prophylaxis have been ordered including bilateral sequential compression devices    Procedure Details: After informed consent, patient was taken to the operating room.  General anesthesia was established, patient positioned supine, secured to the table, all pressure points were padded, abdomen prepped and draped.  Timeout was performed.  Patrick catheter was placed sterilely.  Peritoneal cavity was insufflated with a Veress needle, entered with an 8-mm trocar with Visiport.  The rest of the robotic-assisted ports are placed under direct  vision.  We distended the bladder and made a vertical cystotomy, retracted the lateral wings of the bladder using silk sutures on Tramaine needles.  We examined the inside of the bladder and there were no abnormalities seen.  We scored the mucosa around the adenoma, noting location of the ureteral orifices, and entered the enucleation plane, continued that until the urethra and sharply transected the urethra off the adenoma.  Perforating vessels were controlled with bipolar device and/or monopolar cautery.  Once the adenoma was freed up, we oversewed the anterior capsule, plicating it using 3-0 v-lock, and did the same to posterior capsule, and this achieved excellent hemostasis.  We then positioned a new catheter into the bladder, closed our cystotomy in 2 layers with 2-0 v-lock absorbable sutures, performed a leak test which was negative, and initiated continuous bladder irrigation.  Once this was done, we positioned a drain through our port site, secured to the skin with a 2-0 nylon suture, watched all the ports out, enlarged the camera port site to extract the specimen, closed the fascia with bidirectional running #1 PDS sutures, closed the subcutaneous tissue with 3-0 Vicryl, skin with 4-0 monocryl.  Patient was extubated and taken to PACU.    Complications:  None; patient tolerated the procedure well.    Disposition: PACU - hemodynamically stable.  Condition: stable         Task Performed by RNFA or Surgical Assistant:  Bedside assistance and skin suturing        Additional Details:     Attending Attestation: I performed the procedure.    Ronaldo Cevallos  Phone Number: [phone]

## 2024-12-24 NOTE — H&P
"History Of Present Illness  Qasim Miller is a 59 y.o. male presenting with enlarged prostate and significant urinary symptoms.  Medical therapy was not effective.    He is recently diagnosed with HIV, low load per ID, and has not started treatment yet.     Past Medical History  Past Medical History:   Diagnosis Date    Adhesive capsulitis of right shoulder 11/30/2021    Allergic rhinitis     BPH (benign prostatic hyperplasia)     BPH with LUT    Dyslipidemia     Elevated PSA     Gallbladder disease     Hemangioma of liver     HIV disease (Multi)     HIV viral load: 12,000 (detected) 10/31/24    Urinary obstruction     Vision loss        Surgical History  Past Surgical History:   Procedure Laterality Date    COLONOSCOPY  10/27/2015    Colonoscopy (Fiberoptic)    PROSTATE BIOPSY  10/20/2023        Social History  He reports that he has never smoked. He has never used smokeless tobacco. He reports current alcohol use. He reports that he does not use drugs.    Family History  Family History   Problem Relation Name Age of Onset    Anesthesia related problems Father          Allergies  Patient has no known allergies.    Review of Systems   Constitutional: Negative.    Respiratory: Negative.     Gastrointestinal: Negative.    Genitourinary:  Positive for decreased urine volume, difficulty urinating and urgency.        Physical Exam  Constitutional:       Appearance: Normal appearance. He is normal weight.   Cardiovascular:      Rate and Rhythm: Normal rate.   Pulmonary:      Effort: Pulmonary effort is normal.   Abdominal:      General: Abdomen is flat.      Palpations: Abdomen is soft.   Musculoskeletal:      Cervical back: Normal range of motion.   Neurological:      Mental Status: He is alert.          Last Recorded Vitals  Blood pressure 134/79, pulse 90, temperature 36.6 °C (97.9 °F), resp. rate 17, height 1.803 m (5' 11\"), weight 79 kg (174 lb 2.6 oz), SpO2 96%.    Relevant Results         Assessment/Plan "   Assessment & Plan  BPH with urinary obstruction    Plan:  Robotic simple prostatectomy today        Ronaldo Cevallos MD

## 2024-12-24 NOTE — ANESTHESIA PROCEDURE NOTES
Airway  Date/Time: 12/24/2024 8:26 AM  Urgency: elective    Airway not difficult    Staffing  Performed: CRNA   Authorized by: Carlos Vo MD    Performed by: SHRUTI Joseph-SHERICE  Patient location during procedure: OR    Indications and Patient Condition  Indications for airway management: anesthesia  Spontaneous ventilation: present  Sedation level: deep  Preoxygenated: yes  Patient position: sniffing  Mask difficulty assessment: 1 - vent by mask  Planned trial extubation    Final Airway Details  Final airway type: endotracheal airway      Successful airway: ETT  Cuffed: yes   Successful intubation technique: video laryngoscopy  Facilitating devices/methods: intubating stylet  Endotracheal tube insertion site: oral  Blade: Sal  Blade size: #4  ETT size (mm): 8.0  Cormack-Lehane Classification: grade I - full view of glottis  Placement verified by: chest auscultation and capnometry   Measured from: lips  Number of attempts at approach: 1    Additional Comments  Atraumatic, dentition and lips in pre-induction condition.

## 2024-12-24 NOTE — DISCHARGE SUMMARY
Discharge Diagnosis  BPH with urinary obstruction    Issues Requiring Follow-Up  Post surgical follow up    Test Results Pending At Discharge  Results for orders placed or performed during the hospital encounter of 12/24/24 (from the past 96 hours)   Type And Screen   Result Value Ref Range    ABO TYPE O     Rh TYPE POS     ANTIBODY SCREEN NEG    VERIFY ABO/Rh Group Test   Result Value Ref Range    ABO TYPE O     Rh TYPE POS    CBC   Result Value Ref Range    WBC 9.5 4.4 - 11.3 x10*3/uL    nRBC 0.0 0.0 - 0.0 /100 WBCs    RBC 4.39 (L) 4.50 - 5.90 x10*6/uL    Hemoglobin 12.3 (L) 13.5 - 17.5 g/dL    Hematocrit 36.3 (L) 41.0 - 52.0 %    MCV 83 80 - 100 fL    MCH 28.0 26.0 - 34.0 pg    MCHC 33.9 32.0 - 36.0 g/dL    RDW 12.7 11.5 - 14.5 %    Platelets 182 150 - 450 x10*3/uL   Basic metabolic panel   Result Value Ref Range    Glucose 106 (H) 74 - 99 mg/dL    Sodium 136 136 - 145 mmol/L    Potassium 3.7 3.5 - 5.3 mmol/L    Chloride 105 98 - 107 mmol/L    Bicarbonate 23 21 - 32 mmol/L    Anion Gap 12 10 - 20 mmol/L    Urea Nitrogen 16 6 - 23 mg/dL    Creatinine 0.92 0.50 - 1.30 mg/dL    eGFR >90 >60 mL/min/1.73m*2    Calcium 8.2 (L) 8.6 - 10.3 mg/dL           Hospital Course   This is a 59 year old male with past medical history significant for BPH with LUTS s/p robotic assisted simple prostate resection. Post op course uncomplicated. Anticipated discharge 12/25 home with scripts and follow up appointments. Drained removed morning of POD1. Will DC with pleitez in place with follow up 12/31 for possible removal. Discharge plan was discussed with the patient and all of the patient's questions were answered.      Pertinent Physical Exam At Time of Discharge  Physical Exam  Constitutional:       General: He is not in acute distress.  Cardiovascular:      Rate and Rhythm: Normal rate.   Pulmonary:      Effort: Pulmonary effort is normal.   Genitourinary:     Comments: Pleitez in place  Skin:     General: Skin is warm and dry.    Neurological:      General: No focal deficit present.      Mental Status: He is alert and oriented to person, place, and time.         Home Medications     Medication List      START taking these medications     bacitracin 500 unit/gram ointment; Apply topically 2 times a day. Apply   to tip of penis while pleitez in place   docusate sodium 100 mg capsule; Commonly known as: Colace; Take 1   capsule (100 mg) by mouth 2 times a day for 7 days.   oxyCODONE 5 mg immediate release tablet; Commonly known as: Roxicodone;   Take 1 tablet (5 mg) by mouth every 6 hours if needed for severe pain (7 -   10).   phenazopyridine 100 mg tablet; Commonly known as: Pyridium; Take 1   tablet (100 mg) by mouth 3 times a day as needed for bladder spasms.   sulfamethoxazole-trimethoprim 400-80 mg tablet; Commonly known as:   Bactrim; Take 1 tablet by mouth 2 times a day for 7 days.     STOP taking these medications     Centrum Silver 0.4 mg-300 mcg- 250 mcg; Generic drug: multivitamin with   minerals iron-free   cholecalciferol 25 MCG (1000 UT) capsule; Commonly known as: Vitamin D-3   Clenpiq 10 mg-3.5 gram- 12 gram/175 mL solution; Generic drug: sod   picosulf-mag ox-citric ac   diazePAM 5 mg tablet; Commonly known as: Valium   ibuprofen 600 mg tablet       Outpatient Follow-Up  Future Appointments   Date Time Provider Department Center   12/31/2024 10:45 AM [facility] [place] [facility]   1/2/2025 10:20 AM Ronaldo Cevallos MD [id number] [facility]   1/30/2025 11:00 AM GEN PAT [facility]   2/13/2025 12:30 PM Taurus Garcia MD [id number] [facility]       Taylor Borges, APRN-CNP

## 2024-12-24 NOTE — DISCHARGE INSTRUCTIONS
DEPARTMENT OF UROLOGY  DISCHARGE INSTRUCTIONS [facility]OLIFT  Outpatient Surgery    C O N F I D E N T I A L   I N F O R M A T I O N    Qasim Miller    Call [phone] during regular daytime business hours (8:00 am - 5:00 pm) and after 5:00 pm and ask for the Urology resident with any questions or concerns.    If it is a life-threatening situation, proceed to the nearest emergency department.        Follow-up appointment:  12/31/24     Thank you for the opportunity to care for you today.  Your health and healing are very important to us.  We hope we made you feel as comfortable as possible and are committed to your recovery and continued well-being.      The following is a brief overview of your procedure today. Some of the information contained on this summary may be confidential.  This information should be kept in your records and should be shared with your regular doctor.    Physicians:   Dr. Cevallos    Procedure performed: ROBOTIC ASSISTED SIMPLE PROSTATE RESECTION     What to Expect During your Recovery and Home Care  Anesthesia Side Effects   You received anesthesia yesterday.  You may feel sleepy, tired, or have a sore throat.   You may also feel drowsiness, dizziness, or inability to think clearly.  For your safety, do not drive, drink alcoholic beverages, take any unprescribed medication or make any important decisions for 24 hours.  A responsible adult should be with you for 24 hours.        Activity and Recovery    No heavy lifting over ten pounds. If catheter present, limit activity while urinary catheter is in place. Avoid activities that would cause pulling or tugging on your catheter. Avoid having the catheter pulled/under tension.    Do not drive or operate heavy machinery while having the catheter or taking narcotic pain medications as these medications can alter perception, impair judgement, and slow reaction times.    Pain Control  Unfortunately, you may experience pain after your procedure.   Adequate management can include alternative measures to help ease your pain and can include over the counter Tylenol or oxycodone can be taken as prescribed as needed for breakthrough pain. Do not take more than 4,000mg of Tylenol in a 24-hour period.      You may experience bladder spasms due to having a the catheter. Ditropan may be prescribed to help alleviate this symptom. You may have also been prescribed Pyridium (for burning sensation). Pyridium will turn your urine bright orange.    Nausea/Vomiting   Clear liquids are best tolerated at first. Start slow, advance your diet as tolerated to normal foods. Avoid spicy, greasy, heavy foods at first. Also, you may feel nauseous or like you need to vomit if you take any type of medication on an empty stomach.  Call your physician if you are unable to eat or drink and have persistent vomiting.    Signs of Bleeding   You are going to have some blood in your urine. Your urine will be light pink to yellow. If you have a catheter you always want to look at the urine in the tubing of your catheter and not in the large urine collection bag to check for bleeding. If urine becomes thick dark sue red, has large clots or stops draining, please notify your physician.    Treatment/wound care:   Keep area(s) clean and dry. Clean around the tip or your penis were the catheter goes in daily with mild soap and water.   It is okay to shower 24 hours after time of surgery.    If you have a catheter do not submerge your catheter in standing water until seen for follow up appointment (no tub bathing, swimming, or hot tubs).     Signs of Infection  Signs of infection can include fever, drainage(green/yellow), chills, burning sensation with passing of urine, catheter leakage, or severe abdominal pain.  If you see any of these occur, please contact your doctor's office at [phone].  Any fever higher than 100.4, especially if associated with an ill feeling, abdominal pain, chills, or  nausea should be reported to your surgeon.      Assist in bowel movements/urination  Increase fiber in diet  Increase water (6 to 8 glasses)  Increase walking    Additional Instructions:   Frequency and urgency of urination are common symptoms after this procedure and can last weeks but will decrease over time.    CATHETER CARE  Always keep the catheters tubing and drainage bag below the level of your bladder.  Avoid loops and kinks in the catheter tubing.  NOTIFY your physician if catheter falls out or catheter seems clogged and urine is not draining.   Do not wear the small leg bag to bed you should be provided with a larger overnight bag that you should wear to bed and can hang over the side of the bed.  We recommend wearing the large bag in the shower, as this is easy to dry, and you do not get your leg straps wet from your leg bag.   Your catheter should be secured to your upper thigh, do not allow it to hang or dangle.  Your catheter will be removed at your post-operative appointment.

## 2024-12-24 NOTE — ANESTHESIA POSTPROCEDURE EVALUATION
Patient: Qasim Miller    Procedure Summary       Date: 12/24/24 Room / Location: [facility] / [facility]    Anesthesia Start: 0815 Anesthesia Stop: 1124    Procedure: ROBOTIC ASSISTED SIMPLE PROSTATE RESECTION Diagnosis:       BPH with urinary obstruction      (BPH with urinary obstruction [N40.1, N13.8])    Surgeons: Ronaldo Cevallos MD Responsible Provider: Carlos Vo MD    Anesthesia Type: general ASA Status: 2            Anesthesia Type: general    Vitals Value Taken Time   BP See vitals 12/24/24 1130   Temp  12/24/24 1130   Pulse  12/24/24 1130   Resp  12/24/24 1130   SpO2  12/24/24 1130       Anesthesia Post Evaluation    Patient location during evaluation: PACU  Patient participation: complete - patient participated  Level of consciousness: awake  Pain management: adequate  Multimodal analgesia pain management approach  Airway patency: patent  Two or more strategies used to mitigate risk of obstructive sleep apnea  Cardiovascular status: acceptable  Respiratory status: acceptable  Hydration status: acceptable  Postoperative Nausea and Vomiting: none        There were no known notable events for this encounter.

## 2024-12-25 VITALS
SYSTOLIC BLOOD PRESSURE: 95 MMHG | HEART RATE: 65 BPM | RESPIRATION RATE: 18 BRPM | WEIGHT: 174.16 LBS | OXYGEN SATURATION: 95 % | DIASTOLIC BLOOD PRESSURE: 57 MMHG | BODY MASS INDEX: 24.38 KG/M2 | HEIGHT: 71 IN | TEMPERATURE: 99 F

## 2024-12-25 LAB
ANION GAP SERPL CALC-SCNC: 12 MMOL/L (ref 10–20)
BUN SERPL-MCNC: 16 MG/DL (ref 6–23)
CALCIUM SERPL-MCNC: 8.2 MG/DL (ref 8.6–10.3)
CHLORIDE SERPL-SCNC: 105 MMOL/L (ref 98–107)
CO2 SERPL-SCNC: 23 MMOL/L (ref 21–32)
CREAT SERPL-MCNC: 0.92 MG/DL (ref 0.5–1.3)
EGFRCR SERPLBLD CKD-EPI 2021: >90 ML/MIN/1.73M*2
ERYTHROCYTE [DISTWIDTH] IN BLOOD BY AUTOMATED COUNT: 12.7 % (ref 11.5–14.5)
GLUCOSE SERPL-MCNC: 106 MG/DL (ref 74–99)
HCT VFR BLD AUTO: 36.3 % (ref 41–52)
HGB BLD-MCNC: 12.3 G/DL (ref 13.5–17.5)
MCH RBC QN AUTO: 28 PG (ref 26–34)
MCHC RBC AUTO-ENTMCNC: 33.9 G/DL (ref 32–36)
MCV RBC AUTO: 83 FL (ref 80–100)
NRBC BLD-RTO: 0 /100 WBCS (ref 0–0)
PLATELET # BLD AUTO: 182 X10*3/UL (ref 150–450)
POTASSIUM SERPL-SCNC: 3.7 MMOL/L (ref 3.5–5.3)
RBC # BLD AUTO: 4.39 X10*6/UL (ref 4.5–5.9)
SODIUM SERPL-SCNC: 136 MMOL/L (ref 136–145)
WBC # BLD AUTO: 9.5 X10*3/UL (ref 4.4–11.3)

## 2024-12-25 PROCEDURE — 2500000001 HC RX 250 WO HCPCS SELF ADMINISTERED DRUGS (ALT 637 FOR MEDICARE OP): Performed by: NURSE PRACTITIONER

## 2024-12-25 PROCEDURE — 7100000011 HC EXTENDED STAY RECOVERY HOURLY - NURSING UNIT

## 2024-12-25 PROCEDURE — 36415 COLL VENOUS BLD VENIPUNCTURE: CPT | Performed by: NURSE PRACTITIONER

## 2024-12-25 PROCEDURE — 2500000004 HC RX 250 GENERAL PHARMACY W/ HCPCS (ALT 636 FOR OP/ED): Performed by: NURSE PRACTITIONER

## 2024-12-25 PROCEDURE — 80048 BASIC METABOLIC PNL TOTAL CA: CPT | Performed by: NURSE PRACTITIONER

## 2024-12-25 PROCEDURE — 85027 COMPLETE CBC AUTOMATED: CPT | Performed by: NURSE PRACTITIONER

## 2024-12-25 RX ORDER — OXYCODONE HYDROCHLORIDE 5 MG/1
5 TABLET ORAL EVERY 6 HOURS PRN
Qty: 15 TABLET | Refills: 0 | Status: SHIPPED | OUTPATIENT
Start: 2024-12-25

## 2024-12-25 RX ORDER — DOCUSATE SODIUM 100 MG/1
100 CAPSULE, LIQUID FILLED ORAL 2 TIMES DAILY
Qty: 60 CAPSULE | Refills: 0 | Status: SHIPPED | OUTPATIENT
Start: 2024-12-25 | End: 2025-01-24

## 2024-12-25 RX ORDER — PHENAZOPYRIDINE HYDROCHLORIDE 100 MG/1
100 TABLET, FILM COATED ORAL 3 TIMES DAILY PRN
Qty: 10 TABLET | Refills: 0 | Status: SHIPPED | OUTPATIENT
Start: 2024-12-25

## 2024-12-25 RX ORDER — SULFAMETHOXAZOLE AND TRIMETHOPRIM 400; 80 MG/1; MG/1
1 TABLET ORAL 2 TIMES DAILY
Qty: 14 TABLET | Refills: 0 | Status: SHIPPED | OUTPATIENT
Start: 2024-12-25 | End: 2025-01-01

## 2024-12-25 RX ADMIN — ACETAMINOPHEN 975 MG: 325 TABLET, FILM COATED ORAL at 03:37

## 2024-12-25 RX ADMIN — ACETAMINOPHEN 975 MG: 325 TABLET, FILM COATED ORAL at 08:40

## 2024-12-25 RX ADMIN — SODIUM CHLORIDE, POTASSIUM CHLORIDE, SODIUM LACTATE AND CALCIUM CHLORIDE 100 ML/HR: 600; 310; 30; 20 INJECTION, SOLUTION INTRAVENOUS at 03:35

## 2024-12-25 RX ADMIN — OXYCODONE 5 MG: 5 TABLET ORAL at 13:40

## 2024-12-25 RX ADMIN — OXYCODONE 5 MG: 5 TABLET ORAL at 03:37

## 2024-12-25 RX ADMIN — DOCUSATE SODIUM 100 MG: 100 CAPSULE, LIQUID FILLED ORAL at 08:40

## 2024-12-25 ASSESSMENT — COGNITIVE AND FUNCTIONAL STATUS - GENERAL
MOBILITY SCORE: 24
DAILY ACTIVITIY SCORE: 24

## 2024-12-25 ASSESSMENT — ACTIVITIES OF DAILY LIVING (ADL): LACK_OF_TRANSPORTATION: NO

## 2024-12-25 ASSESSMENT — PAIN SCALES - GENERAL
PAINLEVEL_OUTOF10: 4
PAINLEVEL_OUTOF10: 0 - NO PAIN
PAINLEVEL_OUTOF10: 4
PAINLEVEL_OUTOF10: 3

## 2024-12-25 ASSESSMENT — PAIN - FUNCTIONAL ASSESSMENT: PAIN_FUNCTIONAL_ASSESSMENT: 0-10

## 2024-12-25 NOTE — CARE PLAN
Problem: Pain - Adult  Goal: Verbalizes/displays adequate comfort level or baseline comfort level  Outcome: Progressing     Problem: Safety - Adult  Goal: Free from fall injury  Outcome: Progressing     Problem: Discharge Planning  Goal: Discharge to home or other facility with appropriate resources  Outcome: Progressing     Problem: Chronic Conditions and Co-morbidities  Goal: Patient's chronic conditions and co-morbidity symptoms are monitored and maintained or improved  Outcome: Progressing   The patient's goals for the shift include      The clinical goals for the shift include pain control and no clots in pleitez      
The patient's goals for the shift include      The clinical goals for the shift include monitor output from pleitez and ARNOLD drain output      Problem: Pain - Adult  Goal: Verbalizes/displays adequate comfort level or baseline comfort level  Outcome: Progressing     Problem: Safety - Adult  Goal: Free from fall injury  Outcome: Progressing     Problem: Discharge Planning  Goal: Discharge to home or other facility with appropriate resources  Outcome: Progressing     Problem: Chronic Conditions and Co-morbidities  Goal: Patient's chronic conditions and co-morbidity symptoms are monitored and maintained or improved  Outcome: Progressing     
no

## 2024-12-25 NOTE — PROGRESS NOTES
12/25/24 0833   Discharge Planning   Living Arrangements Alone   Support Systems Spouse/significant other;Family members   Assistance Needed A&0x3, independent with ADLs, no DME, drives, room air, no cpap or bipap, not active with any HHC, PCP is Ilya Saenz   Type of Residence Private residence   Number of Stairs to Enter Residence 0   Number of Stairs Within Residence 0   Do you have animals or pets at home? Yes   Type of Animals or Pets 2 dogs, 5 cats   Who is requesting discharge planning? Provider   Home or Post Acute Services None   Expected Discharge Disposition Home  (Patient plans to stay with significant other on d/c, denies any HHC needs, on room air, will d/c with pleitez)   Does the patient need discharge transport arranged? No   Financial Resource Strain   How hard is it for you to pay for the very basics like food, housing, medical care, and heating? Not hard   Housing Stability   In the last 12 months, was there a time when you were not able to pay the mortgage or rent on time? N   In the past 12 months, how many times have you moved where you were living? 0   At any time in the past 12 months, were you homeless or living in a shelter (including now)? N   Transportation Needs   In the past 12 months, has lack of transportation kept you from medical appointments or from getting medications? no   In the past 12 months, has lack of transportation kept you from meetings, work, or from getting things needed for daily living? No   Stroke Family Assessment   Stroke Family Assessment Needed No   Intensity of Service   Intensity of Service 0-30 min

## 2024-12-26 ENCOUNTER — PHARMACY VISIT (OUTPATIENT)
Dept: PHARMACY | Facility: CLINIC | Age: 59
End: 2024-12-26
Payer: COMMERCIAL

## 2024-12-31 ENCOUNTER — HOSPITAL ENCOUNTER (OUTPATIENT)
Dept: RADIOLOGY | Facility: HOSPITAL | Age: 59
Discharge: HOME | End: 2024-12-31
Payer: COMMERCIAL

## 2024-12-31 ENCOUNTER — APPOINTMENT (OUTPATIENT)
Dept: RADIOLOGY | Facility: HOSPITAL | Age: 59
End: 2024-12-31
Payer: COMMERCIAL

## 2024-12-31 DIAGNOSIS — N13.8 BPH WITH URINARY OBSTRUCTION: ICD-10-CM

## 2024-12-31 DIAGNOSIS — N40.1 BPH WITH URINARY OBSTRUCTION: ICD-10-CM

## 2024-12-31 PROCEDURE — 2550000001 HC RX 255 CONTRASTS: Performed by: STUDENT IN AN ORGANIZED HEALTH CARE EDUCATION/TRAINING PROGRAM

## 2024-12-31 PROCEDURE — 74430 CONTRAST X-RAY BLADDER: CPT

## 2024-12-31 RX ADMIN — DIATRIZOATE MEGLUMINE 150 ML: 300 INJECTION, SOLUTION INTRAVENOUS at 11:34

## 2024-12-31 NOTE — PROGRESS NOTES
Subjective   Patient ID: Qasim Miller is a 59 y.o. male.      HPI  59 y.o. male presents status post RASP performed on 12/24/2024. The patient was experiencing significant LUTS, which were uncontrolled on medication. He had 2 prior prostate biopsies with benign results. He underwent a cystoscopy that revealed an enlarged prostate with intravesical component and no bladder tumor. The pathology report has not been completed as of today.     Post operatively he has recovered well. He has no signs of infection or surgical complications. He is pleased with the outcomes of the procedure. He states the Patrick catheter was painful.    He is continuing to wear incontinence pads. This is more of precautionary measure.     The patient is curious if he should reschedule his colonoscopy that is in 02/2025.     PSA:  6.2 - 07/19/2024  6.3 - 01/26/2024    Review of Systems  A complete review of systems was performed. All systems are noted to be negative unless indicated in the history of present illness, impression, active problem list, or past histories.     Objective   Physical Exam  Abdomen: surgical incision is clean, dry and intact.     PVR: 23 mL    Assessment/Plan   Diagnoses and all orders for this visit:  Elevated PSA  -     Prostate Specific Antigen; Future  Benign prostatic hyperplasia without lower urinary tract symptoms      59 y.o. male presents status post RASP performed on 12/24/2024. The patient was experiencing significant LUTS, which were uncontrolled on medication. He had 2 prior prostate biopsies with benign results. He underwent a cystoscopy that revealed an enlarged prostate with intravesical component and no bladder tumor. He returns for pathology report results.     Post-operatively he has recovered well.     I have advised him that he should reschedule his colonoscopy as it is too soon post operatively. I have also instructed him to wait 6 weeks for sexual activity, heavy lifting and long distance  [place].     I would like to have a bladder scan completed for a PVR to ensure that he is completely emptying his bladder.     Plan:  PSA in 3 months  Return in 3 months    Scribe Attestation   By signing my name below, I, Jojo Rapp, Scribe attestation that this documentation has been prepared under the direction and in the presence of Ronaldo Cevallos MD.

## 2025-01-02 ENCOUNTER — OFFICE VISIT (OUTPATIENT)
Dept: UROLOGY | Facility: CLINIC | Age: 60
End: 2025-01-02
Payer: COMMERCIAL

## 2025-01-02 ENCOUNTER — APPOINTMENT (OUTPATIENT)
Dept: UROLOGY | Facility: CLINIC | Age: 60
End: 2025-01-02
Payer: COMMERCIAL

## 2025-01-02 DIAGNOSIS — R97.20 ELEVATED PSA: Primary | ICD-10-CM

## 2025-01-02 DIAGNOSIS — N40.0 BENIGN PROSTATIC HYPERPLASIA WITHOUT LOWER URINARY TRACT SYMPTOMS: ICD-10-CM

## 2025-01-02 PROCEDURE — 99024 POSTOP FOLLOW-UP VISIT: CPT | Performed by: STUDENT IN AN ORGANIZED HEALTH CARE EDUCATION/TRAINING PROGRAM

## 2025-01-06 LAB
LABORATORY COMMENT REPORT: NORMAL
PATH REPORT.FINAL DX SPEC: NORMAL
PATH REPORT.GROSS SPEC: NORMAL
PATH REPORT.RELEVANT HX SPEC: NORMAL
PATH REPORT.TOTAL CANCER: NORMAL

## 2025-01-30 ENCOUNTER — APPOINTMENT (OUTPATIENT)
Dept: PREADMISSION TESTING | Facility: HOSPITAL | Age: 60
End: 2025-01-30
Payer: COMMERCIAL

## 2025-02-04 NOTE — PROGRESS NOTES
Subjective   Patient ID: Qasim Miller is a 59 y.o. male.      HPI  59 y.o. male presents with status post RASP performed on 12/24/2024. The patient was experiencing significant LUTS, which were uncontrolled on medication. He had 2 prior prostate biopsies with benign results. He underwent a cystoscopy that revealed an enlarged prostate with intravesical component and no bladder tumor.     He did not complete the most recently ordered PSA level.     The patient had been healing well post-operatively. He reports that he has lost sensation of voiding 10-12 days ago, now he is noting some discomfort when he stream starts. He also complaints of burning. He is wearing diapers due so incontinence over the past 7 days. He is noting some abnormality with his penis.  He does empty his bladder to completion.     PVR today is 70 ml.      Surgical Pathology Exam collected on 12/24/2024:  FINAL DIAGNOSIS   A.  Prostate tissue (simple prostatectomy):  Prostatic tissue with glandular hyperplasia and stromal hypertrophy.         PSA:  6.2 - 07/19/2024  6.3 - 01/26/2024    Review of Systems  A complete review of systems was performed. All systems are noted to be negative unless indicated in the history of present illness, impression, active problem list, or past histories.     Objective   Physical Exam  General: Well developed, well nourished, alert and cooperative, appears in no acute distress   : phallus circumcised, normal in size, no plaques or lesions. Testicles normal in size and texture, no masses   RAO: prostate enlarged, smooth surface, no nodules         Assessment/Plan   Diagnoses and all orders for this visit:  Benign prostatic hyperplasia with urinary hesitancy      59 y.o. male presents with status post RASP performed on 12/24/2024. The patient was experiencing significant LUTS, which were uncontrolled on medication. He had 2 prior prostate biopsies with benign results. He underwent a cystoscopy that revealed an  enlarged prostate with intravesical component and no bladder tumor.     The patient had been healing well post-operatively. He reports that he has lost sensation of voiding 10-12 days ago, now he is noting some discomfort when he stream starts. He also complaints of burning. We discussed the possibility of this being a UTI and he might need antibiotics. The other possibility of scheduling  a cystoscopy, which can be cancelled should his sx improve.     PVR today is 70 ml.    Plan:  PVR and Urine PCR today.  Will contact the patient with urine PCR results.  Will scheduled cystoscopy in 1 month to evaluate reason for recurrent obstruction      Scribe Attestation   By signing my name below, Jojo BERMUDEZ, Brii attestation that this documentation has been prepared under the direction and in the presence of Ronaldo Cevallos MD.     Scribe Attestation  By signing my name below, I, Ronaldo Cevallos MD, Brii attest that this documentation has been prepared under the direction and in the presence of Ronaldo Cevallos MD.

## 2025-02-05 ENCOUNTER — OFFICE VISIT (OUTPATIENT)
Dept: UROLOGY | Facility: CLINIC | Age: 60
End: 2025-02-05
Payer: COMMERCIAL

## 2025-02-05 DIAGNOSIS — R39.11 BENIGN PROSTATIC HYPERPLASIA WITH URINARY HESITANCY: Primary | ICD-10-CM

## 2025-02-05 DIAGNOSIS — N40.1 BENIGN PROSTATIC HYPERPLASIA WITH URINARY HESITANCY: Primary | ICD-10-CM

## 2025-02-05 PROCEDURE — 99024 POSTOP FOLLOW-UP VISIT: CPT | Performed by: STUDENT IN AN ORGANIZED HEALTH CARE EDUCATION/TRAINING PROGRAM

## 2025-02-12 ENCOUNTER — PROCEDURE VISIT (OUTPATIENT)
Dept: UROLOGY | Facility: CLINIC | Age: 60
End: 2025-02-12
Payer: COMMERCIAL

## 2025-02-12 DIAGNOSIS — N99.115 POSTPROCEDURAL MALE FOSSA NAVICULARIS URETHRAL STRICTURE: Primary | ICD-10-CM

## 2025-02-12 DIAGNOSIS — N13.9 ACUTE URINARY OBSTRUCTION: ICD-10-CM

## 2025-02-12 PROCEDURE — 52281 CYSTOSCOPY AND TREATMENT: CPT | Performed by: STUDENT IN AN ORGANIZED HEALTH CARE EDUCATION/TRAINING PROGRAM

## 2025-02-12 RX ORDER — LEVOFLOXACIN 500 MG/1
500 TABLET, FILM COATED ORAL DAILY
Qty: 5 TABLET | Refills: 0 | Status: SHIPPED | OUTPATIENT
Start: 2025-02-12 | End: 2025-02-17

## 2025-02-12 RX ORDER — LEVOFLOXACIN 500 MG/1
500 TABLET, FILM COATED ORAL DAILY
Qty: 5 TABLET | Refills: 0 | Status: SHIPPED | OUTPATIENT
Start: 2025-02-12 | End: 2025-02-12

## 2025-02-12 NOTE — PROGRESS NOTES
Subjective   Patient ID: Qasim Miller is a 59 y.o. male.    PROCEDURE NOTE:    PREOPERATIVE DIAGNOSIS:  BPH with urinary hesitancy   Inability to void    POSTOPERATIVE DIAGNOSIS:  Same    OPERATION:  Flexible Cystourethroscopy    SURGEON:  Ronaldo Cevallos MD    ANESTHESIA:  2%  lidocaine jelly    COMPLICATIONS:  None    EBL: Minimal    SPECIMEN:  Voided urine was not collected and submitted for cytology.    DISPOSITION:  The patient was discharged home after the procedure, per routine.    INDICATIONS: :  Mr. Miller is a 59 y.o. patient with a history of inability to void along with BPH and urinary retention who presents today for Cystoscopy.     The indications, risks and benefits of this procedure were discussed with the patient, consent was obtained prior to the procedure, and to the best of my judgement the patient seemed to understand and agree to the procedure.    PROCEDURE:  The patient  was brought into the procedure suite and informed consent was reviewed and confirmed. Vital signs were obtained prior to the procedure: There were no vitals taken for this visit..  The patient was escorted onto the stretcher, placed supine, prepped with betadine and draped in the usual standard surgical fashion.  Intraurethral 2% viscous lidocaine jelly was used for local analgesia.  A 16 Italian flexible cystourethroscope could not be inserted into the urethra because of narrow meatus.   The penile urethra was completely narrowed down at the urethral stricture.  A guidewire was inserted and advanced smoothly. The urethral sounds were inserted sequentially starting with 8Fr up to 16 Fr.  The cystoscope was then inserted over the wire and advanced through the stricture. There were no other urethral strictures. The prostate urethra was wide open with healing granulation tissue.  Upon entering the bladder the entire bladder was surveyed in a 360 degree fashion.  The left and right ureteral orifices were in normal  orthotopic position effluxing clear yellow urine, bilaterally.   There was no evidence of any bladder lesions, foreign objects, stones or evidence of any mucosal changes. A stricture was visualized and dilated. The cystoscope was then retroflexed.  The bladder neck was then further examined without any evidence of lesions. The scope was then removed and in an antegrade fashion, the urethra and bladder were again resurveyed with no evidence of additional lesions.  The cystoscope was then fully removed.   A 16Fr [place]-tip catheter was inserted over the wire until there was clear urine drainage. Balloon was inflated with 10cc sterile water, and Patrick was connected to a drainage bag.    The patient tolerated the procedure well.  Vitals were stable after the procedure.  The patient was able to void and was discharged home.  Verbal and written Post procedure instructions were reviewed with the patient.    IMPRESSION:  Penile urethral meatal stricture that was dilated     PLAN:  Levofloxacin 500 mg daily for prophylaxis  Keep Patrick catheter for 5 days       HPI  59 y.o. male presents for a cystoscopy due to the complete inability to void. He is status post RASP on 12/24/2024. During his last visit on 02/05/2025, the patient expressed that he lost the sensation of voiding 10-12 days prior. He states he has discomfort when starting the urinary stream.     Review of Systems  A complete review of systems was performed. All systems are noted to be negative unless indicated in the history of present illness, impression, active problem list, or past histories.     Objective   Physical Exam  Procedures:  16fr latex catheter was placed in a sterile fashion. Patient will return in 4 weeks for next exchange.      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      59 y.o. male presents for a cystoscopy due to the inability to void. He is status post RASP on 12/24/2024. During his last visit on 02/05/2025, the patient expressed that he lost the sensation of voiding 10-12 days prior. He states he has discomfort when starting the urinary stream.     The patient underwent a cystoscopy with dilation today in office. He tolerated the procedure well without incidence. The details are above. The results are a stricture was visualized and dilated.     Due to the patient's inability to void and cystoscopy results, I have placed a catheter in a sterile fashion.    Plan:  Ciprofloxacin 500 mg daily   FUV on 02/17/2025      Scribe Attestation   By signing my name below, I, Jojo Rapp, Scribe attestation that this documentation has been prepared under the direction and in the presence of Ronaldo Cevlalos MD.

## 2025-02-16 NOTE — PROGRESS NOTES
Subjective   Patient ID: Qasim Miller is a 59 y.o. male.      HPI  59 y.o. male presents for a TOV visit. He is status post RASP on 12/24/2025. He underwent a cystoscopy on 02/12/2025 due to the inability to void. A stricture was visualized and dilated during the procedure. A catheter was placed in sterile fashion to the patient's inability to void and his cystoscopy results.     Review of Systems  A complete review of systems was performed. All systems are noted to be negative unless indicated in the history of present illness, impression, active problem list, or past histories.     Objective   Physical Exam  TOV:     Assessment/Plan   There are no diagnoses linked to this encounter.    59 y.o. male presents for a TOV visit. He is status post RASP on 12/24/2025. He underwent a cystoscopy on 02/12/2025 due to the inability to void. A stricture was visualized and dilated during the procedure. A catheter was placed in sterile fashion to the patient's inability to void and his cystoscopy results.     I personally reviewed the medical records of the patient including the lab values, the note of the referring physician and I reviewed the report and visualized the images performed focusing on ***    Plan:        Scribe Attestation   By signing my name below, I, Jojo Rapp, Scribe attestation that this documentation has been prepared under the direction and in the presence of Ronaldo Cevallos MD.

## 2025-02-17 ENCOUNTER — APPOINTMENT (OUTPATIENT)
Dept: UROLOGY | Facility: CLINIC | Age: 60
End: 2025-02-17
Payer: COMMERCIAL

## 2025-02-17 DIAGNOSIS — N99.115 POSTPROCEDURAL MALE FOSSA NAVICULARIS URETHRAL STRICTURE: Primary | ICD-10-CM

## 2025-02-17 PROCEDURE — 99024 POSTOP FOLLOW-UP VISIT: CPT | Performed by: STUDENT IN AN ORGANIZED HEALTH CARE EDUCATION/TRAINING PROGRAM

## 2025-02-17 NOTE — PATIENT INSTRUCTIONS
Apply over the counter triple antibiotic ointment to the tip of the penis 3 times a day for a next few days  Catheterize once weekly for dilation

## 2025-02-26 ENCOUNTER — TELEPHONE (OUTPATIENT)
Dept: UROLOGY | Facility: CLINIC | Age: 60
End: 2025-02-26
Payer: COMMERCIAL

## 2025-02-26 NOTE — TELEPHONE ENCOUNTER
Patient called into office with some questions. Returned the call with no answer. Office number [phone] left for call back.

## 2025-02-27 NOTE — PROGRESS NOTES
Subjective   Patient ID: Qasim Miller is a 59 y.o. male.      HPI  59 y.o. male presents for a follow up visit. He is status post RASP on 12/24/2025. He underwent a cystoscopy on 02/12/2025 due to the inability to void. A stricture was visualized and dilated during the procedure. He is currently performing self catheterization weekly.    Last week he had difficulty inserting the catheter, heard 2 pops, after which the catheter was in and drained urine.  Since then he has been feeling significantly better with better flow and emptying his bladder very well.    Review of Systems  A complete review of systems was performed. All systems are noted to be negative unless indicated in the history of present illness, impression, active problem list, or past histories.     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 male presents for a follow up visit. He is status post RASP on 12/24/2025. He underwent a cystoscopy on 02/12/2025 due to the inability to void. A stricture was visualized and dilated during the procedure. He is currently performing self catheterization weekly.    Plan:  Perform self-catheterization 3 times a week for the next 2 weeks then twice a week for 2 weeks  Provide prescription for lidocaine gel prior to catheterization  Follow-up in 1 month to reevaluate frequency of self-catheterization      Scribe Attestation   By signing my name below, Jojo BERMUDEZ, Scribe attestation that this documentation has been prepared under the direction and in the presence of Ronaldo Cevallos MD.

## 2025-03-03 ENCOUNTER — APPOINTMENT (OUTPATIENT)
Dept: UROLOGY | Facility: CLINIC | Age: 60
End: 2025-03-03
Payer: COMMERCIAL

## 2025-03-03 ENCOUNTER — OFFICE VISIT (OUTPATIENT)
Dept: UROLOGY | Facility: CLINIC | Age: 60
End: 2025-03-03
Payer: COMMERCIAL

## 2025-03-03 DIAGNOSIS — N99.115 POSTPROCEDURAL MALE FOSSA NAVICULARIS URETHRAL STRICTURE: Primary | ICD-10-CM

## 2025-03-03 PROCEDURE — 99024 POSTOP FOLLOW-UP VISIT: CPT | Performed by: STUDENT IN AN ORGANIZED HEALTH CARE EDUCATION/TRAINING PROGRAM

## 2025-03-03 RX ORDER — LIDOCAINE HYDROCHLORIDE 20 MG/ML
1 JELLY TOPICAL AS NEEDED
Qty: 20 ML | Refills: 0 | OUTPATIENT
Start: 2025-03-03 | End: 2025-04-02

## 2025-03-16 NOTE — PROGRESS NOTES
Subjective   Patient ID: Qasim Miller is a 59 y.o. male.      HPI  59 y.o. male presents for a follow up visit. He is status post RASP on 12/24/2024. He underwent a cystoscopy on 02/12/2025 due to the inability to void. A stricture was visualized and dilated during the procedure. He is currently performing self catheterization weekly.     3 weeks ago, he had difficulty inserting the catheter, heard 2 pops and afterwards the catheter was in, drained urine. He has felt significantly better since with better flow and emptying his bladder very well.     Review of Systems  A complete review of systems was performed. All systems are noted to be negative unless indicated in the history of present illness, impression, active problem list, or past histories.     Objective   Physical Exam    Assessment/Plan   There are no diagnoses linked to this encounter.    59 y.o. male presents for a follow up visit. He is status post RASP on 12/24/2024. He underwent a cystoscopy on 02/12/2025 due to the inability to void. A stricture was visualized and dilated during the procedure. He is currently performing self catheterization weekly.     I personally reviewed the medical records of the patient including the lab values, the note of the referring physician and I reviewed the report and visualized the images performed focusing on ***    Plan:  Perform self-catheterization 3 times a week for the next 2 weeks then twice a week for 2 weeks  Provide prescription for lidocaine gel prior to catheterization  Follow-up in 1 month to reevaluate frequency of self-catheterization      Scribe Attestation   By signing my name below, I, Jojo Rapp, Scribe attestation that this documentation has been prepared under the direction and in the presence of Ronaldo Cevallos MD.    surgery. If the patient has an elevated PSA level, I have advised him to reach out to the clinic. Otherwise, he is able to follow up in 3 months.       Plan:  PSA in 04/2025, reach out to clinic if PSA level is elevated  Reach out to clinic if urinary stream starts to slow down, will then consider visual urethrotomy in OR  FUV in 3 months       Scribe Attestation   By signing my name below, I, Jojo Rapp, Scribe attestation that this documentation has been prepared under the direction and in the presence of Ronaldo Cevallos MD.

## 2025-03-17 ENCOUNTER — APPOINTMENT (OUTPATIENT)
Dept: UROLOGY | Facility: CLINIC | Age: 60
End: 2025-03-17
Payer: COMMERCIAL

## 2025-03-17 DIAGNOSIS — N99.115 POSTPROCEDURAL MALE FOSSA NAVICULARIS URETHRAL STRICTURE: Primary | ICD-10-CM

## 2025-03-17 PROCEDURE — 99024 POSTOP FOLLOW-UP VISIT: CPT | Performed by: STUDENT IN AN ORGANIZED HEALTH CARE EDUCATION/TRAINING PROGRAM

## 2025-04-02 ENCOUNTER — APPOINTMENT (OUTPATIENT)
Dept: UROLOGY | Facility: CLINIC | Age: 60
End: 2025-04-02
Payer: COMMERCIAL

## 2025-04-16 ENCOUNTER — PREP FOR PROCEDURE (OUTPATIENT)
Dept: UROLOGY | Facility: HOSPITAL | Age: 60
End: 2025-04-16

## 2025-04-16 ENCOUNTER — TELEMEDICINE (OUTPATIENT)
Dept: UROLOGY | Facility: CLINIC | Age: 60
End: 2025-04-16
Payer: COMMERCIAL

## 2025-04-16 ENCOUNTER — HOSPITAL ENCOUNTER (OUTPATIENT)
Dept: CARDIOLOGY | Facility: HOSPITAL | Age: 60
Discharge: HOME | End: 2025-04-16
Payer: COMMERCIAL

## 2025-04-16 ENCOUNTER — PRE-ADMISSION TESTING (OUTPATIENT)
Dept: PREADMISSION TESTING | Facility: HOSPITAL | Age: 60
End: 2025-04-16
Payer: COMMERCIAL

## 2025-04-16 DIAGNOSIS — Z01.818 ENCOUNTER FOR PREADMISSION TESTING: Primary | ICD-10-CM

## 2025-04-16 DIAGNOSIS — Z01.818 ENCOUNTER FOR PREADMISSION TESTING: ICD-10-CM

## 2025-04-16 DIAGNOSIS — N99.110 POSTPROCEDURAL MALE URETHRAL MEATAL STRICTURE: Primary | ICD-10-CM

## 2025-04-16 PROCEDURE — 87081 CULTURE SCREEN ONLY: CPT | Mod: GENLAB

## 2025-04-16 PROCEDURE — 93005 ELECTROCARDIOGRAM TRACING: CPT

## 2025-04-16 PROCEDURE — 99024 POSTOP FOLLOW-UP VISIT: CPT | Performed by: STUDENT IN AN ORGANIZED HEALTH CARE EDUCATION/TRAINING PROGRAM

## 2025-04-16 RX ORDER — BICTEGRAVIR SODIUM, EMTRICITABINE, AND TENOFOVIR ALAFENAMIDE FUMARATE 50; 200; 25 MG/1; MG/1; MG/1
1 TABLET ORAL DAILY
COMMUNITY

## 2025-04-16 RX ORDER — DIAZEPAM 5 MG/1
5 TABLET ORAL EVERY 8 HOURS PRN
COMMUNITY
End: 2025-04-18 | Stop reason: HOSPADM

## 2025-04-16 RX ORDER — CHLORHEXIDINE GLUCONATE ORAL RINSE 1.2 MG/ML
SOLUTION DENTAL
Qty: 120 ML | Refills: 0 | Status: SHIPPED | OUTPATIENT
Start: 2025-04-16 | End: 2025-04-18 | Stop reason: HOSPADM

## 2025-04-16 RX ORDER — CHLORHEXIDINE GLUCONATE 40 MG/ML
SOLUTION TOPICAL
Qty: 473 ML | Refills: 0 | Status: SHIPPED | OUTPATIENT
Start: 2025-04-16 | End: 2025-04-18 | Stop reason: HOSPADM

## 2025-04-16 ASSESSMENT — DUKE ACTIVITY SCORE INDEX (DASI)
TOTAL_SCORE: 58.2
CAN YOU HAVE SEXUAL RELATIONS: YES
CAN YOU DO LIGHT WORK AROUND THE HOUSE LIKE DUSTING OR WASHING DISHES: YES
CAN YOU PARTICIPATE IN MODERATE RECREATIONAL ACTIVITIES LIKE GOLF, BOWLING, DANCING, DOUBLES TENNIS OR THROWING A BASEBALL OR FOOTBALL: YES
CAN YOU WALK INDOORS, SUCH AS AROUND YOUR HOUSE: YES
CAN YOU TAKE CARE OF YOURSELF (EAT, DRESS, BATHE, OR USE TOILET): YES
CAN YOU RUN A SHORT DISTANCE: YES
CAN YOU DO MODERATE WORK AROUND THE HOUSE LIKE VACUUMING, SWEEPING FLOORS OR CARRYING GROCERIES: YES
CAN YOU DO HEAVY WORK AROUND THE HOUSE LIKE SCRUBBING FLOORS OR LIFTING AND MOVING HEAVY FURNITURE: YES
CAN YOU PARTICIPATE IN STRENOUS SPORTS LIKE SWIMMING, SINGLES TENNIS, FOOTBALL, BASKETBALL, OR SKIING: YES
CAN YOU WALK A BLOCK OR TWO ON LEVEL GROUND: YES
DASI METS SCORE: 9.9
CAN YOU DO YARD WORK LIKE RAKING LEAVES, WEEDING OR PUSHING A MOWER: YES
CAN YOU CLIMB A FLIGHT OF STAIRS OR WALK UP A HILL: YES

## 2025-04-16 NOTE — PROGRESS NOTES
Patient ID: Qasim Miller is a 59 y.o. male.        HPI  Telephone visit, permission obtained.    59 y.o. male presents for a follow up visit. He is status post RASP on 12/24/2024. He underwent a cystoscopy on 02/12/2025 due to the inability to void. A stricture was visualized and dilated during the procedure. He stopped performing self catheterization because the catheter would not go past the stricture, however has been voiding freely with good flow until few days ago when he reported the symptoms significantly worsening.      We have discussed in the past the need for urethral meatoplasty if symptoms recur and at this point he would like to proceed.      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 male presents for a follow up visit. He is status post RASP on 12/24/2024. He underwent a cystoscopy on 02/12/2025 due to the inability to void. A stricture was visualized and dilated during the procedure. He stopped performing self catheterization because the catheter would not go past the stricture, however has been voiding freely with good flow until few days ago when he reported the symptoms significantly worsening.      We have discussed in the past the need for urethral meatoplasty if symptoms recur and at this point he would like to proceed.    I discussed with him the risks, benefits, and alternatives to this procedure.  I reviewed the details of the procedure and the postoperative care.    Patient would like to proceed.        Plan:  PSA in 04/2025, reach out to clinic if PSA level is elevated  Reach out to clinic if urinary stream starts to slow down, will then consider visual urethrotomy in OR  FUV in 3 months

## 2025-04-16 NOTE — PREPROCEDURE INSTRUCTIONS
Medication List            Accurate as of April 16, 2025  3:07 PM. Always use your most recent med list.                Biktarvy -25 mg tablet  Generic drug: tkhjazfjl-bowoidcz-yvmojjj ala  Medication Adjustments for Surgery: Do Not take on the morning of surgery     * chlorhexidine 0.12 % solution  Commonly known as: Peridex  Rinse with 15 mL the night before surgery and the morning of.  Medication Adjustments for Surgery: Take/Use as prescribed     * chlorhexidine 4 % external liquid  Commonly known as: Hibiclens  Apply to entire body focusing on surgical area, avoiding genitals, wash an rinse thoroughly for 5 days leading up to surgery  Medication Adjustments for Surgery: Take/Use as prescribed     diazePAM 5 mg tablet  Commonly known as: Valium  Medication Adjustments for Surgery: Take on the morning of surgery           * This list has 2 medication(s) that are the same as other medications prescribed for you. Read the directions carefully, and ask your doctor or other care provider to review them with you.                        NPO Instructions:    Do not eat any food after midnight the night before your surgery/procedure.    You may have clear liquids up to three hours prior to your procedure; after this three hour window do not have any gum, mints, candy or clear liquids    Additional Instructions:     We will call you the business day before your procedure between 1-3p to confirm your procedure and arrival time     Please park in the back of the hospital, in the ED parking and proceed to the second floor. This is through the ED waiting room and take the elevator to the second floor. When you get off the elevator you will check in the OUTPATIENT department on the left.     Please keep in mind the construction on [address]     Please make arrangements to have a responsible adult take you home and stay with you. NO DRIVING FOR 24 HOURS.     Please bring your ID and insurance card to your procedure      It is anesthesia's policy to not use any marijuana, CBD or THC products 24 hours prior to procedure. This is due to not knowing how the products will react with anesthesia.     If you get ill at all before your procedure- CALL YOUR DOCTOR/SURGEON. We want you in the best shape that is possible. Any sickness might lead to your procedure being delayed.     Please follow your medication list as listed above     Please call our deparment if you start a new medication between your PAT and procedure     Please shower or bathe the morning of your procedure with antibacterial soap. You may receive instructions for Hibiclens shower.     Shower as you normally would do     Use soap from neck down, focusing on area that is having the surgery (avoid eyes and genitals)     Turn water off and let dry for three minutes.     Turn water back on and rinse off     When checked in to the outpatient unit, you will be asked to change into a hospital gown and remove all clothing, including underwear     An IV will be inserted      Your family or  will be asked to wait in the waiting room or cafeteria and will be notified when able to come sit with you     Typical recovery time will be aprox 60 minutes in PACU. Please let us know if you are having pain or nausea so we can assist you.     Please call [phone] with any further questions

## 2025-04-17 LAB
ATRIAL RATE: 57 BPM
P AXIS: 49 DEGREES
P OFFSET: 186 MS
P ONSET: 126 MS
PR INTERVAL: 198 MS
Q ONSET: 225 MS
QRS COUNT: 9 BEATS
QRS DURATION: 82 MS
QT INTERVAL: 404 MS
QTC CALCULATION(BAZETT): 393 MS
QTC FREDERICIA: 397 MS
R AXIS: 13 DEGREES
T AXIS: 40 DEGREES
T OFFSET: 427 MS
VENTRICULAR RATE: 57 BPM

## 2025-04-18 ENCOUNTER — ANESTHESIA (OUTPATIENT)
Dept: OPERATING ROOM | Facility: HOSPITAL | Age: 60
End: 2025-04-18
Payer: COMMERCIAL

## 2025-04-18 ENCOUNTER — PHARMACY VISIT (OUTPATIENT)
Dept: PHARMACY | Facility: CLINIC | Age: 60
End: 2025-04-18
Payer: COMMERCIAL

## 2025-04-18 ENCOUNTER — HOSPITAL ENCOUNTER (OUTPATIENT)
Facility: HOSPITAL | Age: 60
Setting detail: OUTPATIENT SURGERY
Discharge: HOME | End: 2025-04-18
Attending: STUDENT IN AN ORGANIZED HEALTH CARE EDUCATION/TRAINING PROGRAM | Admitting: STUDENT IN AN ORGANIZED HEALTH CARE EDUCATION/TRAINING PROGRAM
Payer: COMMERCIAL

## 2025-04-18 ENCOUNTER — ANESTHESIA EVENT (OUTPATIENT)
Dept: OPERATING ROOM | Facility: HOSPITAL | Age: 60
End: 2025-04-18
Payer: COMMERCIAL

## 2025-04-18 VITALS
TEMPERATURE: 97.7 F | DIASTOLIC BLOOD PRESSURE: 68 MMHG | RESPIRATION RATE: 15 BRPM | HEIGHT: 71 IN | OXYGEN SATURATION: 100 % | SYSTOLIC BLOOD PRESSURE: 136 MMHG | HEART RATE: 56 BPM | BODY MASS INDEX: 23.1 KG/M2 | WEIGHT: 165 LBS

## 2025-04-18 DIAGNOSIS — N99.110 POSTPROCEDURAL MALE URETHRAL MEATAL STRICTURE: Primary | ICD-10-CM

## 2025-04-18 LAB
APPEARANCE UR: CLEAR
BILIRUB UR STRIP.AUTO-MCNC: NEGATIVE MG/DL
COLOR UR: NORMAL
GLUCOSE UR STRIP.AUTO-MCNC: NORMAL MG/DL
HOLD SPECIMEN: NORMAL
KETONES UR STRIP.AUTO-MCNC: NEGATIVE MG/DL
LEUKOCYTE ESTERASE UR QL STRIP.AUTO: NEGATIVE
NITRITE UR QL STRIP.AUTO: NEGATIVE
PH UR STRIP.AUTO: 7 [PH]
PROT UR STRIP.AUTO-MCNC: NEGATIVE MG/DL
RBC # UR STRIP.AUTO: NEGATIVE MG/DL
SP GR UR STRIP.AUTO: 1.02
STAPHYLOCOCCUS SPEC CULT: NORMAL
UROBILINOGEN UR STRIP.AUTO-MCNC: NORMAL MG/DL

## 2025-04-18 PROCEDURE — 7100000010 HC PHASE TWO TIME - EACH INCREMENTAL 1 MINUTE: Performed by: STUDENT IN AN ORGANIZED HEALTH CARE EDUCATION/TRAINING PROGRAM

## 2025-04-18 PROCEDURE — C1769 GUIDE WIRE: HCPCS | Performed by: STUDENT IN AN ORGANIZED HEALTH CARE EDUCATION/TRAINING PROGRAM

## 2025-04-18 PROCEDURE — 53450 REVISION OF URETHRA: CPT | Performed by: STUDENT IN AN ORGANIZED HEALTH CARE EDUCATION/TRAINING PROGRAM

## 2025-04-18 PROCEDURE — 2500000004 HC RX 250 GENERAL PHARMACY W/ HCPCS (ALT 636 FOR OP/ED): Performed by: PHYSICIAN ASSISTANT

## 2025-04-18 PROCEDURE — 81003 URINALYSIS AUTO W/O SCOPE: CPT | Performed by: PHYSICIAN ASSISTANT

## 2025-04-18 PROCEDURE — 7100000002 HC RECOVERY ROOM TIME - EACH INCREMENTAL 1 MINUTE: Performed by: STUDENT IN AN ORGANIZED HEALTH CARE EDUCATION/TRAINING PROGRAM

## 2025-04-18 PROCEDURE — 3600000008 HC OR TIME - EACH INCREMENTAL 1 MINUTE - PROCEDURE LEVEL THREE: Performed by: STUDENT IN AN ORGANIZED HEALTH CARE EDUCATION/TRAINING PROGRAM

## 2025-04-18 PROCEDURE — RXMED WILLOW AMBULATORY MEDICATION CHARGE

## 2025-04-18 PROCEDURE — 2720000007 HC OR 272 NO HCPCS: Performed by: STUDENT IN AN ORGANIZED HEALTH CARE EDUCATION/TRAINING PROGRAM

## 2025-04-18 PROCEDURE — 2500000005 HC RX 250 GENERAL PHARMACY W/O HCPCS: Performed by: PHYSICIAN ASSISTANT

## 2025-04-18 PROCEDURE — 7100000009 HC PHASE TWO TIME - INITIAL BASE CHARGE: Performed by: STUDENT IN AN ORGANIZED HEALTH CARE EDUCATION/TRAINING PROGRAM

## 2025-04-18 PROCEDURE — 2500000004 HC RX 250 GENERAL PHARMACY W/ HCPCS (ALT 636 FOR OP/ED): Mod: JZ | Performed by: NURSE ANESTHETIST, CERTIFIED REGISTERED

## 2025-04-18 PROCEDURE — 2500000005 HC RX 250 GENERAL PHARMACY W/O HCPCS: Performed by: STUDENT IN AN ORGANIZED HEALTH CARE EDUCATION/TRAINING PROGRAM

## 2025-04-18 PROCEDURE — 3700000002 HC GENERAL ANESTHESIA TIME - EACH INCREMENTAL 1 MINUTE: Performed by: STUDENT IN AN ORGANIZED HEALTH CARE EDUCATION/TRAINING PROGRAM

## 2025-04-18 PROCEDURE — 3600000003 HC OR TIME - INITIAL BASE CHARGE - PROCEDURE LEVEL THREE: Performed by: STUDENT IN AN ORGANIZED HEALTH CARE EDUCATION/TRAINING PROGRAM

## 2025-04-18 PROCEDURE — 7100000001 HC RECOVERY ROOM TIME - INITIAL BASE CHARGE: Performed by: STUDENT IN AN ORGANIZED HEALTH CARE EDUCATION/TRAINING PROGRAM

## 2025-04-18 PROCEDURE — 3700000001 HC GENERAL ANESTHESIA TIME - INITIAL BASE CHARGE: Performed by: STUDENT IN AN ORGANIZED HEALTH CARE EDUCATION/TRAINING PROGRAM

## 2025-04-18 RX ORDER — ONDANSETRON HYDROCHLORIDE 2 MG/ML
INJECTION, SOLUTION INTRAVENOUS AS NEEDED
Status: DISCONTINUED | OUTPATIENT
Start: 2025-04-18 | End: 2025-04-18

## 2025-04-18 RX ORDER — PROPOFOL 10 MG/ML
INJECTION, EMULSION INTRAVENOUS AS NEEDED
Status: DISCONTINUED | OUTPATIENT
Start: 2025-04-18 | End: 2025-04-18

## 2025-04-18 RX ORDER — KETOROLAC TROMETHAMINE 30 MG/ML
INJECTION, SOLUTION INTRAMUSCULAR; INTRAVENOUS AS NEEDED
Status: DISCONTINUED | OUTPATIENT
Start: 2025-04-18 | End: 2025-04-18

## 2025-04-18 RX ORDER — FENTANYL CITRATE 50 UG/ML
INJECTION, SOLUTION INTRAMUSCULAR; INTRAVENOUS AS NEEDED
Status: DISCONTINUED | OUTPATIENT
Start: 2025-04-18 | End: 2025-04-18

## 2025-04-18 RX ORDER — MIDAZOLAM HYDROCHLORIDE 1 MG/ML
INJECTION INTRAMUSCULAR; INTRAVENOUS AS NEEDED
Status: DISCONTINUED | OUTPATIENT
Start: 2025-04-18 | End: 2025-04-18

## 2025-04-18 RX ORDER — LIDOCAINE HYDROCHLORIDE 20 MG/ML
INJECTION, SOLUTION INFILTRATION; PERINEURAL AS NEEDED
Status: DISCONTINUED | OUTPATIENT
Start: 2025-04-18 | End: 2025-04-18

## 2025-04-18 RX ORDER — SODIUM CHLORIDE, SODIUM LACTATE, POTASSIUM CHLORIDE, CALCIUM CHLORIDE 600; 310; 30; 20 MG/100ML; MG/100ML; MG/100ML; MG/100ML
INJECTION, SOLUTION INTRAVENOUS CONTINUOUS PRN
Status: DISCONTINUED | OUTPATIENT
Start: 2025-04-18 | End: 2025-04-18

## 2025-04-18 RX ORDER — BACITRACIN ZINC 500 UNIT/G
OINTMENT (GRAM) TOPICAL 3 TIMES DAILY
Qty: 28 G | Refills: 0 | Status: SHIPPED | OUTPATIENT
Start: 2025-04-18

## 2025-04-18 RX ORDER — BACITRACIN 500 [USP'U]/G
OINTMENT TOPICAL AS NEEDED
Status: DISCONTINUED | OUTPATIENT
Start: 2025-04-18 | End: 2025-04-18 | Stop reason: HOSPADM

## 2025-04-18 RX ORDER — ACETAMINOPHEN AND CODEINE PHOSPHATE 300; 30 MG/1; MG/1
1 TABLET ORAL EVERY 6 HOURS PRN
Qty: 10 TABLET | Refills: 0 | Status: SHIPPED | OUTPATIENT
Start: 2025-04-18

## 2025-04-18 RX ADMIN — ONDANSETRON 4 MG: 2 INJECTION INTRAMUSCULAR; INTRAVENOUS at 11:49

## 2025-04-18 RX ADMIN — MIDAZOLAM HYDROCHLORIDE 2 MG: 1 INJECTION, SOLUTION INTRAMUSCULAR; INTRAVENOUS at 11:22

## 2025-04-18 RX ADMIN — ERTAPENEM SODIUM 1 G: 1 INJECTION, POWDER, LYOPHILIZED, FOR SOLUTION INTRAMUSCULAR; INTRAVENOUS at 11:22

## 2025-04-18 RX ADMIN — LIDOCAINE HYDROCHLORIDE 100 MG: 20 INJECTION, SOLUTION INFILTRATION; PERINEURAL at 11:27

## 2025-04-18 RX ADMIN — DEXAMETHASONE SODIUM PHOSPHATE 8 MG: 4 INJECTION, SOLUTION INTRAMUSCULAR; INTRAVENOUS at 11:38

## 2025-04-18 RX ADMIN — PROPOFOL 200 MG: 10 INJECTION, EMULSION INTRAVENOUS at 11:27

## 2025-04-18 RX ADMIN — FENTANYL CITRATE 50 MCG: 50 INJECTION, SOLUTION INTRAMUSCULAR; INTRAVENOUS at 11:27

## 2025-04-18 RX ADMIN — SODIUM CHLORIDE, POTASSIUM CHLORIDE, SODIUM LACTATE AND CALCIUM CHLORIDE: 600; 310; 30; 20 INJECTION, SOLUTION INTRAVENOUS at 11:22

## 2025-04-18 RX ADMIN — KETOROLAC TROMETHAMINE 30 MG: 30 INJECTION, SOLUTION INTRAMUSCULAR at 12:24

## 2025-04-18 RX ADMIN — POVIDONE-IODINE 1 APPLICATION: 5 SOLUTION TOPICAL at 10:52

## 2025-04-18 RX ADMIN — FENTANYL CITRATE 50 MCG: 50 INJECTION, SOLUTION INTRAMUSCULAR; INTRAVENOUS at 11:50

## 2025-04-18 SDOH — HEALTH STABILITY: MENTAL HEALTH: CURRENT SMOKER: 0

## 2025-04-18 ASSESSMENT — PAIN SCALES - GENERAL
PAINLEVEL_OUTOF10: 0 - NO PAIN
PAIN_LEVEL: 0
PAINLEVEL_OUTOF10: 0 - NO PAIN

## 2025-04-18 ASSESSMENT — PAIN - FUNCTIONAL ASSESSMENT
PAIN_FUNCTIONAL_ASSESSMENT: 0-10
PAIN_FUNCTIONAL_ASSESSMENT: UNABLE TO SELF-REPORT
PAIN_FUNCTIONAL_ASSESSMENT: 0-10
PAIN_FUNCTIONAL_ASSESSMENT: 0-10
PAIN_FUNCTIONAL_ASSESSMENT: UNABLE TO SELF-REPORT
PAIN_FUNCTIONAL_ASSESSMENT: 0-10
PAIN_FUNCTIONAL_ASSESSMENT: UNABLE TO SELF-REPORT
PAIN_FUNCTIONAL_ASSESSMENT: 0-10

## 2025-04-18 ASSESSMENT — ENCOUNTER SYMPTOMS
HEMATURIA: 0
DYSURIA: 1

## 2025-04-18 NOTE — ANESTHESIA PROCEDURE NOTES
Airway  Date/Time: 4/18/2025 11:28 AM  Reason: elective    Airway not difficult    Staffing  Performed: CRNA   Authorized by: DORIAN Jane    Performed by: SHRUTI Jane-SHERICE  Patient location during procedure: OR    Patient Condition  Indications for airway management: anesthesia  Patient position: sniffing  No planned trial extubation  Sedation level: deep     Final Airway Details   Preoxygenated: yes  Final airway type: supraglottic airway  Successful airway:   Size: 4  Airway Seal Pressure (cm H2O): 30  Number of attempts at approach: 1  Number of other approaches attempted: 0    Additional Comments  Igel LMA inserted without difficulty

## 2025-04-18 NOTE — ANESTHESIA PREPROCEDURE EVALUATION
Addended by: KISHAN GÓMEZ on: 11/26/2024 04:26 PM     Modules accepted: Orders     Patient: Qasim Miller    Procedure Information       Date/Time: 04/18/25 1130    Procedures:       MEATOPLASTY, URETHRA (Urethra)      CYSTOSCOPY, RIGID    Location: [facility] / [facility]    Surgeons: Ronaldo Cevallos MD            Relevant Problems   Anesthesia (within normal limits)      /Renal   (+) BPH with obstruction/lower urinary tract symptoms   (+) BPH with urinary obstruction       Clinical information reviewed:   Tobacco  Allergies  Meds   Med Hx  Surg Hx   Fam Hx  Soc Hx        NPO Detail:  NPO/Void Status  Carbohydrate Drink Given Prior to Surgery? : N  Date of Last Liquid: 04/17/25  Time of Last Liquid: 2100  Date of Last Solid: 04/17/25  Time of Last Solid: 2100  Last Intake Type: Clear fluids         Physical Exam    Airway  Mallampati: II  TM distance: >3 FB  Neck ROM: full  Mouth opening: 3 or more finger widths     Cardiovascular - normal exam   Dental - normal exam     Pulmonary - normal exam   Abdominal - normal exam           Anesthesia Plan    History of general anesthesia?: yes  History of complications of general anesthesia?: no    ASA 2     general     The patient is not a current smoker.    intravenous induction   Anesthetic plan and risks discussed with patient.  Use of blood products discussed with patient who.    Plan discussed with attending.

## 2025-04-18 NOTE — OP NOTE
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 meatal stricture [N99.110] Post-op Diagnosis     * Postprocedural male urethral meatal stricture [N99.110]     Procedures  MEATOPLASTY, URETHRA  19970 - [place] URETHROMEATOPLASTY W/MUCOSAL ADVANCEMENT    CYSTOSCOPY, RIGID  18039 - [place] CYSTOURETHROSCOPY      Surgeons      * Ronaldo Cevallos - Primary    Resident/Fellow/Other Assistant:  Surgeons and Role:  * No surgeons found with a matching role *    Staff:   Circulator: Marisa  Circulator: Rosa Morales Person: Vikki    Anesthesia Staff: CRNA: SHRUTI Jane-SHERICE    Procedure Summary  Anesthesia: General  ASA: II  Estimated Blood Loss: 10 mL  Intra-op Medications:   Administrations occurring from 1130 to 1255 on 25:   Medication Name Total Dose   bacitracin ointment 1 Application   dexAMETHasone (Decadron) injection 4 mg/mL 8 mg   fentaNYL (Sublimaze) injection 50 mcg/mL 50 mcg   ketorolac (Toradol) injection 30 mg 30 mg   lactated Ringer's infusion Cannot be calculated   ondansetron (Zofran) 2 mg/mL injection 4 mg              Anesthesia Record               Intraprocedure I/O Totals          Intake    Propofol Drip 20.00 mL    The total shown is the total volume documented since Anesthesia Start was filed.    lactated Ringer's 700.00 mL    ertapenem (INVanz) 1 g in sodium chloride 0.9% 50 mL IV 60.00 mL    Total Intake 780 mL       Output    Est. Blood Loss 10 mL    Total Output 10 mL       Net    Net Volume 770 mL          Specimen: No specimens collected              Drains and/or Catheters:   Urethral Catheter Double-lumen 18 Fr. (Active)       Tourniquet Times:         Implants:     Findings: circumferential scarring and narrowing of urethral around 1 cm from urethral meatus    Indications: Qasim  Angela is an 59 y.o. 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 History of robotic simple prostatectomy around 4 months ago, with urethral meatal narrowing, initially had a cystoscopy and urethral dilatation in clinic which relieved his retention for some time, was performing self catheterization which also could no longer be inserted, and recently had worsening of his obstruction.    The patient was seen in the preoperative area. The risks, benefits, complications, treatment options, non-operative alternatives, expected recovery and outcomes were discussed with the patient. The possibilities of reaction to medication, pulmonary aspiration, injury to surrounding structures, bleeding, recurrent infection, the need for additional procedures, failure to diagnose a condition, and creating a complication requiring transfusion or operation were discussed with the patient. The patient concurred with the proposed plan, giving informed consent.  The site of surgery was properly noted/marked if necessary per policy. The patient has been actively warmed in preoperative area. Preoperative antibiotics have been ordered and given within 1 hours of incision. Venous thrombosis prophylaxis have been ordered including bilateral sequential compression devices    Procedure Details: Patient was consented and antibiotics were started on call to OR.  Under GA, patient in dorsal lithotomy position, genitalia was scrubbed and draped in the usual manner.   I examined his urethral meatus which was wide open, however on manual eversion of the urethral opening, we were able to see a pinpoint narrowing almost 1 cm deep into the urethra.  We obtained traction sutures using 4-0 Vicryl to deann further that urethral meatus and expose the narrowing.  Then using Iris scissors, we excised the palpable scar circumferentially. We applied tension then using the electrocautery with pinpoint tip, minimal  cauterization was performed to avoid future scarring. Then we approximated the urethral mucosa and placed interrupted 4-0 vicryl sutures to the urethral meatus. The opening appeared wide.  We then obtained a 17 cystoscope which passed smoothly through the reconstructed urethral meatus, scoped down the urethra which had no abnormality, the prostate fossa was wide open, and the bladder appeared distended with some trabeculations, without any other abnormalities. The ureteral orifices appeared in the normal orthotopic position.  The cystoscope was removed.  An 18Fr silicone Patrick catheter was inserted. Then we applied bacitracin over the wound and wrapped the distal penis with gauze roll.  Patient was awakened, tolerated the procedure well, and was transferred to PACU in stable condition.    Complications:  None; patient tolerated the procedure well.    Disposition: PACU - hemodynamically stable.  Condition: stable       Additional Details: Patrick to be removed in 5 days    Attending Attestation: I performed the procedure.    Ronaldo Cevallos  Phone Number: [phone]

## 2025-04-18 NOTE — H&P
"History Of Present Illness  Qasim Miller is a 59 y.o. male presenting with ureteral stricture.  He presents for medial plasty today.  Telephone visit 2 days ago:    59 y.o. male presents for a follow up visit. He is status post RASP on 12/24/2024. He underwent a cystoscopy on 02/12/2025 due to the inability to void. A stricture was visualized and dilated during the procedure. He stopped performing self catheterization because the catheter would not go past the stricture, however has been voiding freely with good flow until few days ago when he reported the symptoms significantly worsening.      We have discussed in the past the need for urethral meatoplasty if symptoms recur and at this point he would like to proceed.       Past Medical History  Medical History[1]    Surgical History  Surgical History[2]     Social History  He reports that he has never smoked. He has never used smokeless tobacco. He reports current alcohol use. He reports that he does not use drugs.    Family History  Family History[3]     Allergies  Patient has no known allergies.    Review of Systems   Genitourinary:  Positive for dysuria. Negative for hematuria.   All other systems reviewed and are negative.       Physical Exam  General: Well developed, awake/alert/oriented x3, no distress, alert and cooperative.    Skin: Warm and dry    Eyes: EOMI    Head/neck: No apparent injury    Cardiac: Regular rate and rhythm, no murmurs    Respiratory: Clear to auscultation bilaterally    GI: nondistended    Extremities: No edema or deformity    MSK: Moving extremities w/o difficulty    Neuro: Normal gait, AxO    Psych: Appropriate mood    Last Recorded Vitals  Blood pressure 145/84, pulse 65, temperature 36.5 °C (97.7 °F), temperature source Temporal, resp. rate 17, height 1.803 m (5' 11\"), weight 74.8 kg (165 lb), SpO2 99%.    Relevant Results  Results for orders placed or performed during the hospital encounter of 04/18/25 (from the past 24 " hours)   Urinalysis with Reflex Culture and Microscopic   Result Value Ref Range    Color, Urine Light-Yellow Light-Yellow, Yellow, Dark-Yellow    Appearance, Urine Clear Clear    Specific Gravity, Urine 1.018 1.005 - 1.035    pH, Urine 7.0 5.0, 5.5, 6.0, 6.5, 7.0, 7.5, 8.0    Protein, Urine NEGATIVE NEGATIVE, 10 (TRACE), 20 (TRACE) mg/dL    Glucose, Urine Normal Normal mg/dL    Blood, Urine NEGATIVE NEGATIVE mg/dL    Ketones, Urine NEGATIVE NEGATIVE mg/dL    Bilirubin, Urine NEGATIVE NEGATIVE mg/dL    Urobilinogen, Urine Normal Normal mg/dL    Nitrite, Urine NEGATIVE NEGATIVE    Leukocyte Esterase, Urine NEGATIVE NEGATIVE      Assessment & Plan  Postprocedural male urethral meatal stricture      Meatoplasty    I spent 15 minutes in the professional and overall care of this patient.      Carlos Hi PA-C         [1]   Past Medical History:  Diagnosis Date    Adhesive capsulitis of right shoulder 11/30/2021    Allergic rhinitis     BPH (benign prostatic hyperplasia)     BPH with LUT    Dyslipidemia     Elevated PSA     Gallbladder disease     Hemangioma of liver     HIV disease (Multi)     HIV viral load: 12,000 (detected) 10/31/24    Urinary obstruction     Vision loss    [2]   Past Surgical History:  Procedure Laterality Date    COLONOSCOPY  10/27/2015    Colonoscopy (Fiberoptic)    PROSTATE BIOPSY  10/20/2023   [3]   Family History  Problem Relation Name Age of Onset    Anesthesia related problems Father

## 2025-04-18 NOTE — DISCHARGE INSTRUCTIONS
Remove Patrick Catheter in 5 days.   Take pain medication as needed.   Apply Bacitracin 3 times daily to tip of penis.   Follow up in early May with Dr. Cevallos.

## 2025-04-18 NOTE — ANESTHESIA POSTPROCEDURE EVALUATION
Patient: Qasim Miller    Procedure Summary       Date: 04/18/25 Room / Location: [facility]    Anesthesia Start: 1122 Anesthesia Stop: 1240    Procedures:       MEATOPLASTY, URETHRA (Urethra)      CYSTOSCOPY, RIGID Diagnosis:       Postprocedural male urethral meatal stricture      (Postprocedural male urethral meatal stricture [N99.110])    Surgeons: Ronaldo Cevallos MD Responsible Provider: DORIAN Jane    Anesthesia Type: general ASA Status: 2            Anesthesia Type: general    Vitals Value Taken Time   /81 04/18/25 13:07   Temp 36.5 °C (97.7 °F) 04/18/25 13:07   Pulse 63 04/18/25 13:07   Resp 15 04/18/25 13:07   SpO2 100 % 04/18/25 13:07       Anesthesia Post Evaluation    Patient location during evaluation: PACU  Patient participation: complete - patient participated  Level of consciousness: awake and alert  Pain score: 0  Pain management: adequate  Airway patency: patent  Cardiovascular status: acceptable  Respiratory status: acceptable  Hydration status: acceptable  Postoperative Nausea and Vomiting: none        There were no known notable events for this encounter.

## 2025-04-19 LAB
BACTERIA UR CULT: NORMAL
PSA SERPL-MCNC: 1.24 NG/ML

## 2025-04-21 ASSESSMENT — PAIN SCALES - GENERAL: PAINLEVEL_OUTOF10: 0 - NO PAIN

## 2025-04-24 ENCOUNTER — PRE-ADMISSION TESTING (OUTPATIENT)
Dept: PREADMISSION TESTING | Facility: HOSPITAL | Age: 60
End: 2025-04-24
Payer: COMMERCIAL

## 2025-04-24 ENCOUNTER — ANESTHESIA EVENT (OUTPATIENT)
Dept: GASTROENTEROLOGY | Facility: HOSPITAL | Age: 60
End: 2025-04-24
Payer: COMMERCIAL

## 2025-04-24 PROBLEM — E78.5 HYPERLIPIDEMIA: Status: ACTIVE | Noted: 2025-04-24

## 2025-04-24 PROBLEM — Z21 HIV (HUMAN IMMUNODEFICIENCY VIRUS INFECTION): Status: ACTIVE | Noted: 2025-04-24

## 2025-04-24 SDOH — HEALTH STABILITY: MENTAL HEALTH: CURRENT SMOKER: 0

## 2025-04-24 ASSESSMENT — DUKE ACTIVITY SCORE INDEX (DASI)
CAN YOU PARTICIPATE IN STRENOUS SPORTS LIKE SWIMMING, SINGLES TENNIS, FOOTBALL, BASKETBALL, OR SKIING: YES
CAN YOU DO LIGHT WORK AROUND THE HOUSE LIKE DUSTING OR WASHING DISHES: YES
CAN YOU CLIMB A FLIGHT OF STAIRS OR WALK UP A HILL: YES
CAN YOU RUN A SHORT DISTANCE: YES
CAN YOU WALK INDOORS, SUCH AS AROUND YOUR HOUSE: YES
TOTAL_SCORE: 58.2
CAN YOU DO YARD WORK LIKE RAKING LEAVES, WEEDING OR PUSHING A MOWER: YES
CAN YOU DO HEAVY WORK AROUND THE HOUSE LIKE SCRUBBING FLOORS OR LIFTING AND MOVING HEAVY FURNITURE: YES
CAN YOU TAKE CARE OF YOURSELF (EAT, DRESS, BATHE, OR USE TOILET): YES
CAN YOU PARTICIPATE IN MODERATE RECREATIONAL ACTIVITIES LIKE GOLF, BOWLING, DANCING, DOUBLES TENNIS OR THROWING A BASEBALL OR FOOTBALL: YES
CAN YOU WALK A BLOCK OR TWO ON LEVEL GROUND: YES
CAN YOU HAVE SEXUAL RELATIONS: YES
DASI METS SCORE: 9.9
CAN YOU DO MODERATE WORK AROUND THE HOUSE LIKE VACUUMING, SWEEPING FLOORS OR CARRYING GROCERIES: YES

## 2025-04-24 NOTE — ANESTHESIA PREPROCEDURE EVALUATION
Patient: Qasim Miller    Procedure Information       Date/Time: 05/15/25 0800    Scheduled providers: Taurus Garcia MD    Procedure: COLONOSCOPY    Location: [facility]            Relevant Problems   Anesthesia (within normal limits)      Cardiac   (+) Hyperlipidemia      /Renal   (+) BPH with obstruction/lower urinary tract symptoms   (+) BPH with urinary obstruction      ID   (+) HIV (human immunodeficiency virus infection)       Clinical information reviewed:   Tobacco  Allergies  Meds   Med Hx  Surg Hx   Fam Hx  Soc Hx      Vitals:    05/15/25 0706   BP: 128/86   Pulse: 61   Resp: 16   Temp: 36.1 °C (97 °F)   SpO2: 98%       Surgical History[1]  Medical History[2]  Current Medications[3]  Prior to Admission medications    Medication Sig Start Date End Date Taking? Authorizing Provider   acetaminophen-codeine (Tylenol w/ Codeine #3) 300-30 mg tablet Take 1 tablet by mouth every 6 hours if needed for severe pain (7 - 10). 4/18/25   Carlos Hi PA-C   bacitracin 500 unit/gram ointment Apply topically 3 times a day. 4/18/25   Carlos Hi PA-C   pfglzsgow-uwpnuukr-rlybpmn ala (Biktarvy) -25 mg tablet Take 1 tablet by mouth once daily.    Historical Provider, MD   chlorhexidine (Hibiclens) 4 % external liquid Apply to entire body focusing on surgical area, avoiding genitals, wash an rinse thoroughly for 5 days leading up to surgery 4/16/25 4/18/25  Carlos Hi PA-C   chlorhexidine (Peridex) 0.12 % solution Rinse with 15 mL the night before surgery and the morning of. 4/16/25 4/18/25  Carlos Hi PA-C   diazePAM (Valium) 5 mg tablet Take 1 tablet (5 mg) by mouth every 8 hours if needed for anxiety.  Patient not taking: Reported on 4/16/2025 4/18/25  Historical Provider, MD     RX Allergies[4]  Social History     Tobacco Use    Smoking status: Never    Smokeless tobacco: Never   Substance Use Topics    Alcohol use: Yes     Comment: SELDOM         Chemistry     Lab Results   Component Value Date/Time     12/25/2024 0556    K 3.7 12/25/2024 0556     12/25/2024 0556    CO2 23 12/25/2024 0556    BUN 16 12/25/2024 0556    CREATININE 0.92 12/25/2024 0556    Lab Results   Component Value Date/Time    CALCIUM 8.2 (L) 12/25/2024 0556    ALKPHOS 71 05/18/2018 0801    AST 19 05/18/2018 0801    ALT 18 05/18/2018 0801    BILITOT 0.8 05/18/2018 0801          Lab Results   Component Value Date/Time    WBC 9.5 12/25/2024 0556    HGB 12.3 (L) 12/25/2024 0556    HCT 36.3 (L) 12/25/2024 0556     12/25/2024 0556     Lab Results   Component Value Date/Time    PROTIME 12.7 11/26/2024 1301    INR 1.1 11/26/2024 1301     Encounter Date: 04/16/25   ECG 12 lead (Ancillary Performed)   Result Value    Ventricular Rate 57    Atrial Rate 57    WY Interval 198    QRS Duration 82    QT Interval 404    QTC Calculation(Bazett) 393    P Axis 49    R Axis 13    T Axis 40    QRS Count 9    Q Onset 225    P Onset 126    P Offset 186    T Offset 427    QTC Fredericia 397    Narrative    Sinus bradycardia  Otherwise normal ECG  No previous ECGs available  Confirmed by Nguyen Monroe ([id number]) on 5/5/2025 1:59:15 PM     Chart reviewed. No clearances ordered.    NPO Detail:  NPO/Void Status  Carbohydrate Drink Given Prior to Surgery? : N  Date of Last Liquid: 05/15/25  Time of Last Liquid: 0430  Date of Last Solid: 05/13/25  Time of Last Solid: 1730  Last Intake Type: Clear fluids  Time of Last Void: 0708         Physical Exam    Airway  Mallampati: II  TM distance: >3 FB  Neck ROM: full  Mouth opening: 3 or more finger widths     Cardiovascular - normal exam   Dental - normal exam     Pulmonary - normal exam   Abdominal - normal exam           Anesthesia Plan    History of general anesthesia?: yes  History of complications of general anesthesia?: no    ASA 3     MAC     The patient is not a current smoker.    intravenous induction            [1]   Past Surgical History:  Procedure  Laterality Date    COLONOSCOPY  10/27/2015    Colonoscopy (Fiberoptic)    PROSTATE BIOPSY  10/20/2023   [2]   Past Medical History:  Diagnosis Date    Adhesive capsulitis of right shoulder 11/30/2021    Allergic rhinitis     BPH (benign prostatic hyperplasia)     BPH with LUT    Dyslipidemia     Elevated PSA     Gallbladder disease     Hemangioma of liver     HIV disease (Multi)     HIV viral load: 12,000 (detected) 10/31/24    Urinary obstruction     Vision loss    [3]   Current Outpatient Medications:     murlsemdh-oqhwwhrw-jgkfrlq ala (Biktarvy) -25 mg tablet, Take 1 tablet by mouth once daily., Disp: , Rfl:     bacitracin 500 unit/gram ointment, Apply topically 3 times a day. (Patient not taking: Reported on 5/15/2025), Disp: 28 g, Rfl: 0    Current Facility-Administered Medications:     lactated Ringer's infusion, 50 mL/hr, intravenous, Continuous, Carlos Hi PA-C  [4] No Known Allergies

## 2025-04-24 NOTE — PREPROCEDURE INSTRUCTIONS
Medication List            Accurate as of April 24, 2025 10:58 AM. Always use your most recent med list.                acetaminophen-codeine 300-30 mg tablet  Commonly known as: Tylenol w/ Codeine #3  Take 1 tablet by mouth every 6 hours if needed for severe pain (7 - 10).  Medication Adjustments for Surgery: Do Not take on the morning of surgery     bacitracin 500 unit/gram ointment  Apply topically 3 times a day.  Medication Adjustments for Surgery: Take/Use as prescribed     Biktarvy -25 mg tablet  Generic drug: ltaeklmfz-bzvwzyth-nrrglwk ala  Medication Adjustments for Surgery: Take/Use as prescribed              NPO Instructions:    5 days before your procedure: Follow a low fiber diet     · No nuts or seeds such as sesame and poppy seeds     · No beans, raw (fresh) fruits, vegetables with seeds, corn, popcorn     · No whole grains such as oatmeal, multigrain, or quinoa.     1 day before your exam, stop eating all solid foods or dairy products. You can only have clear liquids that you can see through:   Examples of clear liquids permitted the day before your procedure: black coffee or coffee with sugar, broth, popsicles, Gatorade or Powerade, Italian ice, Jello, crystal light drink mixes, or apple juice with no pulp (no citrus juices)   No red or purple     Follow PREP instructions.     Take 1st part of prep- Evening Before Procedure. Drink lots of liquids.   Day of Procedure- 3-4am Take 2nd Part of Prep.   Take medications as instructed   You may have clear liquids, medications, gum and hard candy up to THREE HOURS prior to your procedure- General rule is to not put ANYTHING AT ALL in your mouth after this time   Only drink the following clear liquids after midnight the night before your procedure: water, black coffee or coffee with sugar, tea, Gatorade or Clear Soda-Ex. Ginger ale or Sprite     Additional Instructions:     We will call you the business day before your procedure between 1-3p to  confirm your procedure and arrival time     Please Park in the back of the hospital, in the ED parking and proceed to the second floor. This is through the ED waiting room and take the elevator to the second floor. When you get off the elevator you will check in the OUTPATIENT department on the left.     Please keep in mind the construction on [address]     Please make arrangements to have a responsible adult take you home and stay with you. NO DRIVING FOR 24 HOURS.     Please bring your ID and insurance card to your procedure     It is anesthesia's policy to not use any marijuana, CBD or THC products 24 hours prior to procedure. This is due to not knowing how the products will react with anesthesia.     If you get ill at all before your procedure- CALL YOUR DOCTOR/SURGEON. We want you in the best shape that is possible. Any sickness might lead to your procedure being delayed.     Please follow your medication list as listed above     Please call our department if you start a new medication between your PAT and procedure     When checked in to the outpatient unit, you will be asked to change into a hospital gown and remove all clothing, including underwear     An IV will be inserted      Your family or  will be asked to wait in the waiting room or cafeteria and will be notified when able to come sit with you     Typical recovery time will be approx. 30 minutes in PACU. Please let us know if you are having pain or nausea so we can assist you.     Please call [phone] with any further questions

## 2025-04-29 NOTE — PROGRESS NOTES
Subjective   Patient ID: Qasim Miller is a 59 y.o. male.      HPI  59 y.o. male presents for a postoperative follow up to undergo a TOV. He recently underwent a urethral meatoplasty on 04/18/2025.     He is status post RASP on 12/24/2024. He underwent a cystoscopy on 02/12/2025 due to the inability to void. A stricture was visualized and dilated during the procedure. He stopped performing self catheterization because the catheter would not go past the stricture. However he had been voiding freely with good flow until he reported the symptoms significantly worsening.     The urinary stream has improved postoperatively. He notes that he is experiencing a split stream, although this is not bothersome at this time. He notes that he sits to urinate.     Lab Results   Component Value Date    PSA 1.24 04/16/2025    PSA 5.0 (H) 05/18/2018    PSA 4.44 (H) 04/16/2018          Review of Systems  A complete review of systems was performed. All systems are noted to be negative unless indicated in the history of present illness, impression, active problem list, or past histories.     Objective   Physical Exam  Healing meatal reconstruction site, dissolving sutures    Assessment/Plan   Diagnoses and all orders for this visit:  Postprocedural male fossa navicularis urethral stricture      59 y.o. male presents for a postoperative follow up to undergo a TOV. He recently underwent a urethral meatoplasty on 04/18/2025.     I personally reviewed the PSA level that was 1.24 on 04/16/2025. He is able to follow up yearly with his PCP in regards to his PSA.     I reviewed the etiology for the cause of the urethral stricture. I have advised the patient that it is unlikely that he will have another urethral stricture. It is likely that the  stricture was due to the patient's recovery process.    I would like to follow up in 3 months to review his recovery process.     Plan:  FUV in 3 months       Scribe Attestation   By signing my name  below, I, Brii Bush attestation that this documentation has been prepared under the direction and in the presence of Ronaldo Cevallos MD.

## 2025-05-01 ENCOUNTER — APPOINTMENT (OUTPATIENT)
Dept: UROLOGY | Facility: CLINIC | Age: 60
End: 2025-05-01
Payer: COMMERCIAL

## 2025-05-01 DIAGNOSIS — N99.115 POSTPROCEDURAL MALE FOSSA NAVICULARIS URETHRAL STRICTURE: Primary | ICD-10-CM

## 2025-05-01 PROCEDURE — 99024 POSTOP FOLLOW-UP VISIT: CPT | Performed by: STUDENT IN AN ORGANIZED HEALTH CARE EDUCATION/TRAINING PROGRAM

## 2025-05-15 ENCOUNTER — HOSPITAL ENCOUNTER (OUTPATIENT)
Dept: GASTROENTEROLOGY | Facility: HOSPITAL | Age: 60
Discharge: HOME | End: 2025-05-15
Payer: COMMERCIAL

## 2025-05-15 ENCOUNTER — ANESTHESIA (OUTPATIENT)
Dept: GASTROENTEROLOGY | Facility: HOSPITAL | Age: 60
End: 2025-05-15
Payer: COMMERCIAL

## 2025-05-15 VITALS
BODY MASS INDEX: 23.1 KG/M2 | HEIGHT: 71 IN | SYSTOLIC BLOOD PRESSURE: 103 MMHG | HEART RATE: 67 BPM | OXYGEN SATURATION: 96 % | DIASTOLIC BLOOD PRESSURE: 81 MMHG | TEMPERATURE: 97.2 F | WEIGHT: 165 LBS | RESPIRATION RATE: 16 BRPM

## 2025-05-15 DIAGNOSIS — Z12.11 ENCOUNTER FOR SCREENING FOR MALIGNANT NEOPLASM OF COLON: ICD-10-CM

## 2025-05-15 PROCEDURE — 2500000004 HC RX 250 GENERAL PHARMACY W/ HCPCS (ALT 636 FOR OP/ED): Mod: JZ

## 2025-05-15 PROCEDURE — 3700000001 HC GENERAL ANESTHESIA TIME - INITIAL BASE CHARGE

## 2025-05-15 PROCEDURE — 45378 DIAGNOSTIC COLONOSCOPY: CPT | Performed by: SURGERY

## 2025-05-15 PROCEDURE — 7100000010 HC PHASE TWO TIME - EACH INCREMENTAL 1 MINUTE

## 2025-05-15 PROCEDURE — 7100000009 HC PHASE TWO TIME - INITIAL BASE CHARGE

## 2025-05-15 PROCEDURE — 3700000002 HC GENERAL ANESTHESIA TIME - EACH INCREMENTAL 1 MINUTE

## 2025-05-15 RX ORDER — PROPOFOL 10 MG/ML
INJECTION, EMULSION INTRAVENOUS AS NEEDED
Status: DISCONTINUED | OUTPATIENT
Start: 2025-05-15 | End: 2025-05-15

## 2025-05-15 RX ORDER — SODIUM CHLORIDE, SODIUM LACTATE, POTASSIUM CHLORIDE, CALCIUM CHLORIDE 600; 310; 30; 20 MG/100ML; MG/100ML; MG/100ML; MG/100ML
50 INJECTION, SOLUTION INTRAVENOUS CONTINUOUS
Status: ACTIVE | OUTPATIENT
Start: 2025-05-15 | End: 2025-05-15

## 2025-05-15 RX ADMIN — PROPOFOL 50 MG: 10 INJECTION, EMULSION INTRAVENOUS at 08:06

## 2025-05-15 RX ADMIN — PROPOFOL 50 MG: 10 INJECTION, EMULSION INTRAVENOUS at 08:08

## 2025-05-15 RX ADMIN — PROPOFOL 50 MG: 10 INJECTION, EMULSION INTRAVENOUS at 08:04

## 2025-05-15 RX ADMIN — PROPOFOL 50 MG: 10 INJECTION, EMULSION INTRAVENOUS at 08:02

## 2025-05-15 RX ADMIN — PROPOFOL 50 MG: 10 INJECTION, EMULSION INTRAVENOUS at 08:14

## 2025-05-15 RX ADMIN — PROPOFOL 50 MG: 10 INJECTION, EMULSION INTRAVENOUS at 08:00

## 2025-05-15 RX ADMIN — PROPOFOL 50 MG: 10 INJECTION, EMULSION INTRAVENOUS at 08:10

## 2025-05-15 RX ADMIN — SODIUM CHLORIDE: 9 INJECTION, SOLUTION INTRAVENOUS at 07:58

## 2025-05-15 ASSESSMENT — PAIN SCALES - GENERAL
PAINLEVEL_OUTOF10: 0 - NO PAIN
PAINLEVEL_OUTOF10: 0 - NO PAIN
PAIN_LEVEL: 0
PAINLEVEL_OUTOF10: 0 - NO PAIN

## 2025-05-15 ASSESSMENT — PAIN - FUNCTIONAL ASSESSMENT
PAIN_FUNCTIONAL_ASSESSMENT: 0-10

## 2025-05-15 NOTE — DISCHARGE INSTRUCTIONS
Patient Instructions after a Colonoscopy      The anesthetics, sedatives or narcotics which were given to you today will be acting in your body for the next 24 hours, so you might feel a little sleepy or groggy.  This feeling should slowly wear off. Carefully read and follow the instructions.     You received sedation today:  - Do not drive or operate any machinery or power tools of any kind.   - No alcoholic beverages today, not even beer or wine.  - Do not make any important decisions or sign any legal documents.  - No over the counter medications that contain alcohol or that may cause drowsiness.  - Do not make any important decisions or sign any legal documents.  - Make sure you have someone with you for first 24 hours.    While it is common to experience mild to moderate abdominal distention, gas, or belching after your procedure, if any of these symptoms occur following discharge from the GI Lab or within one week of having your procedure, call the [facility] to be advised whether a visit to your nearest Urgent Care or Emergency Department is indicated.  Take this paper with you if you go.     - If you develop an allergic reaction to the medications that were given during your procedure such as difficulty breathing, rash, hives, severe nausea, vomiting or lightheadedness.  - If you experience chest pain, shortness of breath, severe abdominal pain, fevers and chills.  -If you develop signs and symptoms of bleeding such as blood in your spit, if your stools turn black, tarry, or bloody  - If you have not urinated within 8 hours following your procedure.  - If your IV site becomes painful, red, inflamed, or looks infected.    If you received a biopsy/polypectomy/sphincterotomy the following instructions apply below:    __ Do not use Aspirin containing products, non-steroidal medications or anti-coagulants for one week following your procedure. (Examples of these types of medications are: Advil,  Arthrotec, Aleve, Coumadin, Ecotrin, Heparin, Ibuprofen, Indocin, Motrin, Naprosyn, Nuprin, Plavix, Vioxx, and Voltarin, or their generic forms.  This list is not all-inclusive.  Check with your physician or pharmacist before resuming medications.)   __ Eat a soft diet today.  Avoid foods that are poorly digested for the next 24 hours.  These foods would include: nuts, beans, lettuce, red meats, and fried foods. Start with liquids and advance your diet as tolerated, gradually work up to eating solids.   __ Do not have a Barium Study or Enema for one week.    Your physician recommends the additional following instructions:    -You have a contact number available for emergencies. The signs and symptoms of potential delayed complications were discussed with you. You may return to normal activities tomorrow.  -Resume your previous diet.  -Continue your present medications.   -We are waiting for your pathology results.  -Your physician has recommended a repeat colonoscopy (date to be determined after pending pathology results are reviewed) for surveillance based on pathology results.  -The findings and recommendations have been discussed with you.  -The findings and recommendations were discussed with your family.  - Please see Medication Reconciliation Form for new medication/medications prescribed.       If you experience any problems or have any questions following discharge from the GI Lab, please call:        Nurse Signature                                                                        Date___________________                                                                            Patient/Responsible Party Signature                                        Date___________________

## 2025-05-15 NOTE — H&P
"History Of Present Illness  Qasim Miller is a 59 y.o. male presenting for a high risk  colonoscopy      Past Medical History  Medical History[1]    Surgical History  Surgical History[2]     Social History  He reports that he has never smoked. He has never used smokeless tobacco. He reports current alcohol use. He reports that he does not use drugs.    Family History  Family History[3]     Allergies  Patient has no known allergies.    Review of Systems   All other systems reviewed and are negative.       Physical Exam  Vitals reviewed.   Constitutional:       Appearance: Normal appearance.   HENT:      Head: Normocephalic.   Cardiovascular:      Rate and Rhythm: Normal rate and regular rhythm.      Heart sounds: Normal heart sounds.   Pulmonary:      Effort: Pulmonary effort is normal.      Breath sounds: Normal breath sounds.   Abdominal:      General: Abdomen is flat. There is no distension.      Palpations: Abdomen is soft. There is no mass.      Tenderness: There is no abdominal tenderness. There is no guarding.   Musculoskeletal:         General: Normal range of motion.   Skin:     General: Skin is warm.   Neurological:      General: No focal deficit present.   Psychiatric:         Mood and Affect: Mood normal.          Last Recorded Vitals  Blood pressure 128/86, pulse 61, temperature 36.1 °C (97 °F), temperature source Temporal, resp. rate 16, height 1.803 m (5' 11\"), weight 74.8 kg (165 lb), SpO2 98%.         Assessment & Plan  Encounter for screening for malignant neoplasm of colon    COLONOSCOPY/BIOPSIES.  Risks include, but not limited to pain, infection, bleeding, perforation, missed lesions, aspiration, risk of cardiac, pulmonary, neurologic, locomotor, anesthetic events, incomplete colonoscopy, and other unforeseen complications including death      Taurus Garcia MD         [1]   Past Medical History:  Diagnosis Date    Adhesive capsulitis of right shoulder 11/30/2021    Allergic rhinitis     " BPH (benign prostatic hyperplasia)     BPH with LUT    Dyslipidemia     Elevated PSA     Gallbladder disease     Hemangioma of liver     HIV disease (Multi)     HIV viral load: 12,000 (detected) 10/31/24    Urinary obstruction     Vision loss    [2]   Past Surgical History:  Procedure Laterality Date    COLONOSCOPY  10/27/2015    Colonoscopy (Fiberoptic)    PROSTATE BIOPSY  10/20/2023   [3]   Family History  Problem Relation Name Age of Onset    Anesthesia related problems Father

## 2025-05-15 NOTE — ANESTHESIA POSTPROCEDURE EVALUATION
Patient: Qasim Miller    Procedure Summary       Date: 05/15/25 Room / Location: [facility]    Anesthesia Start: 0758 Anesthesia Stop: 0817    Procedure: COLONOSCOPY Diagnosis: Encounter for screening for malignant neoplasm of colon    Scheduled Providers: Taurus Garcia MD Responsible Provider: DORIAN Espinosa    Anesthesia Type: MAC ASA Status: 3            Anesthesia Type: MAC    Vitals Value Taken Time   /81 05/15/25 08:35   Temp 36.2 °C (97.2 °F) 05/15/25 08:20   Pulse 67 05/15/25 08:35   Resp 16 05/15/25 08:35   SpO2 96 % 05/15/25 08:35       Anesthesia Post Evaluation    Patient location during evaluation: PACU  Patient participation: complete - patient participated  Level of consciousness: awake and alert  Pain score: 0  Pain management: adequate  Airway patency: patent  Cardiovascular status: acceptable  Respiratory status: acceptable  Hydration status: acceptable  Postoperative Nausea and Vomiting: none        No notable events documented.

## 2025-07-15 LAB — PSA SERPL-MCNC: 0.87 NG/ML

## 2025-07-17 NOTE — PROGRESS NOTES
Subjective   Patient ID: Qasim Miller is a 60 y.o. male.      HPI  60 y.o. male presents for a 3 month follow up visit. He is status post urethral meatoplasty. PSA 0.87.    He is status post RASP on 12/24/2024. He underwent a cystoscopy on 02/12/2025 due to the inability to void. A stricture was visualized and dilated during the procedure. He stopped performing self catheterization because the catheter would not go past the stricture. However he had been voiding freely with good flow until he reported the symptoms significantly worsening.     The patient is doing well overall. He is not experiencing any urinary symptoms. He denies hematuria and dysuria. His urinary stream is now straight. He is happy that he underwent the procedure.     Lab Results   Component Value Date    PSA 0.87 07/14/2025    PSA 1.24 04/16/2025    PSA 5.0 (H) 05/18/2018    PSA 4.44 (H) 04/16/2018         Review of Systems  A complete review of systems was performed. All systems are noted to be negative unless indicated in the history of present illness, impression, active problem list, or past histories.     Objective   Physical Exam    Assessment/Plan   There are no diagnoses linked to this encounter.    60 y.o. male presents for a 3 month follow up visit. He is status post urethral meatoplasty. PSA 0.87.     I personally reviewed the PSA level that is 0.87. Prior to the procedure, the patient's PSA level was 6.3. I reassured the patient that the PSA level has reduced significantly postoperatively. The patient's PSA level has returned to a normal value. As a result, the patient will return to checking his PSA level yearly as directed. He will be seen by his PCP.      The patient is able to follow up with his PCP as he no longer requires surgical or urological care. He is in agreement.     Plan:  PSA with PCP every 1-2 years.   FUV with PCP     Scribe Attestation   By signing my name below, Jojo BERMUDEZ, Scribe attestation that  this documentation has been prepared under the direction and in the presence of Ronaldo Cevallos MD.

## 2025-07-21 ENCOUNTER — APPOINTMENT (OUTPATIENT)
Dept: UROLOGY | Facility: CLINIC | Age: 60
End: 2025-07-21
Payer: COMMERCIAL

## 2025-07-21 DIAGNOSIS — N40.0 BENIGN PROSTATIC HYPERPLASIA WITHOUT LOWER URINARY TRACT SYMPTOMS: Primary | ICD-10-CM

## 2025-07-21 PROCEDURE — 1036F TOBACCO NON-USER: CPT | Performed by: STUDENT IN AN ORGANIZED HEALTH CARE EDUCATION/TRAINING PROGRAM

## 2025-07-21 PROCEDURE — 99213 OFFICE O/P EST LOW 20 MIN: CPT | Performed by: STUDENT IN AN ORGANIZED HEALTH CARE EDUCATION/TRAINING PROGRAM

## 2025-07-21 ASSESSMENT — PAIN SCALES - GENERAL: PAINLEVEL_OUTOF10: 0-NO PAIN

## 2025-08-11 ENCOUNTER — APPOINTMENT (OUTPATIENT)
Dept: UROLOGY | Facility: CLINIC | Age: 60
End: 2025-08-11
Payer: COMMERCIAL

## (undated) DEVICE — DRAPE, SHEET, XL

## (undated) DEVICE — CARE KIT, LAPAROSCOPIC, ADVANCED

## (undated) DEVICE — DRESSING, GAUZE, DRAIN SPONGE, 6 PLY, EXCILON, 4 X 4 IN, STERILE

## (undated) DEVICE — DRAPE, COLUMN, DAVINCI XI

## (undated) DEVICE — SEAL, UNIVERSAL 5-8MM  XI

## (undated) DEVICE — SCRUB, APLICARE, 4OZ

## (undated) DEVICE — SYRINGE, 10 CC, LUER LOCK

## (undated) DEVICE — GUIDEWIRE, BENTSON, COATED, 0.035 IN X 145 CM

## (undated) DEVICE — FORCEPS, BIPOLAR FENESTRATED XI

## (undated) DEVICE — SUTURE, VICRYL, 3-0, 27 IN, SH

## (undated) DEVICE — DRAPE PACK, LAVH, W/ATTACHED LEGGINGS, W/POUCH, 100 X 114 IN, LF, STERILE

## (undated) DEVICE — SUTURE, PDS II, 1, 27 IN, CT-1, VIOLET

## (undated) DEVICE — SUTURE, VICRYL, 2-0, 27 IN, BR/SH 27, VIOLET

## (undated) DEVICE — SUTURE, VICRYL, 4-0, 18 IN, P-3, UNDYED

## (undated) DEVICE — COVER HANDLE LIGHT, STERIS, BLUE, STERILE

## (undated) DEVICE — SOLUTION, IRRIGATION, USP, SODIUM CHLORIDE 0.9%, 3000 ML, BAG

## (undated) DEVICE — IRRIGATION SET, CYSTOSCOPY, TURP, Y, CONTINUOUS, 81 IN

## (undated) DEVICE — SUTURE, SILK, 0, 30 IN, KS, BLACK

## (undated) DEVICE — CAUTERY, PENCIL, PUSH BUTTON, SMOKE EVAC, 70MM

## (undated) DEVICE — NEEDLE, INSUFFLATION, 13GAX120MM, DISP

## (undated) DEVICE — BAG, DRAINAGE, SAFETY FLOW, OUTLET DEVICE

## (undated) DEVICE — TUBING SET, TRI-LUMEN, FILTERED, F/AIRSEAL

## (undated) DEVICE — APPLICATOR, CHLORAPREP, W/ORANGE TINT, 26ML

## (undated) DEVICE — COUNTER, NEEDLE, FOAM BLOCK, POP-N-COUNT, W.MAGNET, W/BLADEGUARD 20/40 COUNT, RED

## (undated) DEVICE — SYRINGE, 20 CC, LUER SLIP

## (undated) DEVICE — GOWN, SURGICAL, UROLOGY, IMPERVIOUS, XLONG, XLARGE, DISPOSABLE

## (undated) DEVICE — GLOVE, SURGICAL, PROTEXIS PI , 8.0, PF, LF

## (undated) DEVICE — DRESSING, NON-ADHERENT, TELFA, OUCHLESS, 3 X 8 IN, STERILE

## (undated) DEVICE — COVER, LIGHT HANDLE, SURGICAL, DISPOSABLE, STERILE

## (undated) DEVICE — CATHETER, URETHRAL, FOLEY, 2 WAY, BARDEX LUBRICATH, SHORT LENGTH, 18 FR, 5 CC, LATEX

## (undated) DEVICE — DRIVER, NEEDLE LARGE, DAVINCI XI

## (undated) DEVICE — CAUTERY, PENCIL, E-SEP, SMOKE EVAC, 70MM

## (undated) DEVICE — STATLOCK, FOLEY SWIVEL, SILICONE TRICOT

## (undated) DEVICE — FORCEPS, PROGRASP, DAVINCI XI

## (undated) DEVICE — COVER, TIP HOT SHEARS ENDOWRIST

## (undated) DEVICE — COVER, TABLE, 44X90

## (undated) DEVICE — PREP, SKIN, BETADINE, SOLUTION, 16 OZ

## (undated) DEVICE — TRAY, DRY PREP, PREMIUM

## (undated) DEVICE — COLLECTION BAG, FLUID, NON-STERILE

## (undated) DEVICE — Device

## (undated) DEVICE — DRAPE, ARM XI

## (undated) DEVICE — LUBRICANT, ELECTROLUBE, F/ELECTRODE TIPS

## (undated) DEVICE — TRAY, MINOR, SINGLE BASIN, STERILE

## (undated) DEVICE — ADHESIVE, SKIN, DERMABOND ADVANCED, 15CM, PEN-STYLE

## (undated) DEVICE — ACCESS PORT, 12MM, 100MM LENGTH, LOW PROFILE W/BLADELESS OPTICAL TIP

## (undated) DEVICE — SUTURE, MONOCRYL, 4-0, 18 IN, PS2, UNDYED

## (undated) DEVICE — SYRINGE, 60 CC, IRRIGATION, PISTON, CATH TIP, W/LUER ADAPTER,DISP

## (undated) DEVICE — SUTURE, STRATAFIX, 3-0 6IN, SPIRAL PDS PLUS, RB-1, VIOLET

## (undated) DEVICE — DRESSING, NON-ADHERENT, CURAD, ABSORBENT, 3 X 8 IN, STERILE

## (undated) DEVICE — SPONGE, LAP, XRAY DECT, 18IN X 18IN, W/LOOP, STERILE

## (undated) DEVICE — SUTURE, ETHILON, 2-0, 18 IN, FS, BLACK, BX/12

## (undated) DEVICE — SHIELD, PRE-KLENZ, SOAK, MED 6ML

## (undated) DEVICE — COVER, PLASTIC, MAYO STAND, 29.5IN X 55.5IN

## (undated) DEVICE — POSITIONING, THE PINK PAD, PIGAZZI SYSTEM

## (undated) DEVICE — DRAIN, CHANNEL, HUBLESS, 1/4 ROUND FULL FLUTE, 19 FR/W 1/4" TROCAR"

## (undated) DEVICE — SYRINGE, 30 CC, LUER LOCK

## (undated) DEVICE — BAG, DRAINAGE, ANTI-REFLUX CHAMBER, 2000ML

## (undated) DEVICE — CATHETER, URETHRAL, FOLEY, 3 WAY, 30CC, 22FR

## (undated) DEVICE — SPONGE GAUZE, XRAY SC+RFID, 4X4 16 PLY, STERILE

## (undated) DEVICE — SUTURE, STRATAFIX, SPIRAL PDS PLUS, 2-0, 23CM, CT-2 VIOLET

## (undated) DEVICE — SUTURE, VICRYL, 0, 27 IN, UR-6, VIOLET

## (undated) DEVICE — PREP TRAY, SKIN, DRY, W/GLOVES

## (undated) DEVICE — SOLUTION, IRRIGATION, X RX SODIUM CHL 0.9%, 1000ML BTL

## (undated) DEVICE — GUIDEWIRE, ULTRA TRACK, HYBRID, 0.035 IN X 150CM

## (undated) DEVICE — PAD, GROUNDING, ELECTROSURGICAL, W/9 FT CABLE, POLYHESIVE II, ADULT, LF

## (undated) DEVICE — RETRIEVAL SYSTEM, MONARCH, 10MM DISP ENDOSCOPIC

## (undated) DEVICE — ELECTRODE, ELECTROSURGICAL, GUARDED TIP

## (undated) DEVICE — SPONGE, HEMOSTATIC, CELLULOSE, SURGICEL, 2 X 14 IN

## (undated) DEVICE — KIT, MINOR, DOUBLE BASIN

## (undated) DEVICE — GOWN, SURGICAL, SIRUS, NON REINFORCED, LARGE

## (undated) DEVICE — ASSEMBLY, STRYKER FLOW 2, SUCTION IRRIGATOR, WITH TIP

## (undated) DEVICE — DRAPE PACK, MINOR, CUSTOM, GEAUGA

## (undated) DEVICE — SCISSOR, MINI ENDO CUT, TIPS, DISP

## (undated) DEVICE — CONTAINER, SPECIMEN, 120ML

## (undated) DEVICE — BANDAGE, STRETCH, CONFORM, 4 IN X 4.1 YD, STERILE

## (undated) DEVICE — EVACUATOR, WOUND, SUCTION, CLOSED, JACKSON-PRATT, 100 CC, SILICONE

## (undated) DEVICE — DRAPE, INSTRUMENT, W/POUCH, STERI DRAPE, 9 5/8 X 18 LONG

## (undated) DEVICE — HOLDER, CATHETER, LEGBAND, ADULT, 2 IN, LF

## (undated) DEVICE — CATHETER, URETHRAL, FOLEY, 2 WAY, 18 FR, 5 CC, SILICONE

## (undated) DEVICE — PROTECTORS, ONE STEP, ARM LARGE, W/DERMAPROX

## (undated) DEVICE — CUP, MEDICINE, CLEAR, 2 OZ, STERILE

## (undated) DEVICE — DRAPE, SHEET, UTILITY, NON ABSORBENT, 18 X 26 IN, LF